# Patient Record
Sex: FEMALE | Race: WHITE | NOT HISPANIC OR LATINO | Employment: OTHER | URBAN - METROPOLITAN AREA
[De-identification: names, ages, dates, MRNs, and addresses within clinical notes are randomized per-mention and may not be internally consistent; named-entity substitution may affect disease eponyms.]

---

## 2019-09-09 ENCOUNTER — HOSPITAL ENCOUNTER (OUTPATIENT)
Dept: FAMILY MEDICINE CLINIC | Facility: CLINIC | Age: 74
Discharge: HOME | End: 2019-09-09
Attending: NURSE PRACTITIONER

## 2019-09-09 ENCOUNTER — OFFICE VISIT CONVERTED (OUTPATIENT)
Dept: FAMILY MEDICINE CLINIC | Facility: CLINIC | Age: 74
End: 2019-09-09
Attending: NURSE PRACTITIONER

## 2019-09-09 LAB
ALBUMIN SERPL-MCNC: 4.3 G/DL (ref 3.5–5)
ALBUMIN/GLOB SERPL: 1.8 {RATIO} (ref 1.4–2.6)
ALP SERPL-CCNC: 69 U/L (ref 43–160)
ALT SERPL-CCNC: 26 U/L (ref 10–40)
ANION GAP SERPL CALC-SCNC: 16 MMOL/L (ref 8–19)
AST SERPL-CCNC: 29 U/L (ref 15–50)
BASOPHILS # BLD AUTO: 0.03 10*3/UL (ref 0–0.2)
BASOPHILS NFR BLD AUTO: 0.6 % (ref 0–3)
BILIRUB SERPL-MCNC: 0.51 MG/DL (ref 0.2–1.3)
BUN SERPL-MCNC: 18 MG/DL (ref 5–25)
BUN/CREAT SERPL: 25 {RATIO} (ref 6–20)
CALCIUM SERPL-MCNC: 9.3 MG/DL (ref 8.7–10.4)
CHLORIDE SERPL-SCNC: 101 MMOL/L (ref 99–111)
CHOLEST SERPL-MCNC: 218 MG/DL (ref 107–200)
CHOLEST/HDLC SERPL: 3.3 {RATIO} (ref 3–6)
CONV ABS IMM GRAN: 0.01 10*3/UL (ref 0–0.2)
CONV CO2: 26 MMOL/L (ref 22–32)
CONV IMMATURE GRAN: 0.2 % (ref 0–1.8)
CONV TOTAL PROTEIN: 6.7 G/DL (ref 6.3–8.2)
CREAT UR-MCNC: 0.72 MG/DL (ref 0.5–0.9)
DEPRECATED RDW RBC AUTO: 47.8 FL (ref 36.4–46.3)
EOSINOPHIL # BLD AUTO: 0.1 10*3/UL (ref 0–0.7)
EOSINOPHIL # BLD AUTO: 2.1 % (ref 0–7)
ERYTHROCYTE [DISTWIDTH] IN BLOOD BY AUTOMATED COUNT: 13.9 % (ref 11.7–14.4)
EST. AVERAGE GLUCOSE BLD GHB EST-MCNC: 157 MG/DL
GFR SERPLBLD BASED ON 1.73 SQ M-ARVRAT: >60 ML/MIN/{1.73_M2}
GLOBULIN UR ELPH-MCNC: 2.4 G/DL (ref 2–3.5)
GLUCOSE SERPL-MCNC: 132 MG/DL (ref 65–99)
HBA1C MFR BLD: 7.1 % (ref 3.5–5.7)
HCT VFR BLD AUTO: 46.3 % (ref 37–47)
HDLC SERPL-MCNC: 66 MG/DL (ref 40–60)
HGB BLD-MCNC: 14.6 G/DL (ref 12–16)
LDLC SERPL CALC-MCNC: 130 MG/DL (ref 70–100)
LYMPHOCYTES # BLD AUTO: 1.76 10*3/UL (ref 1–5)
LYMPHOCYTES NFR BLD AUTO: 37.2 % (ref 20–45)
MCH RBC QN AUTO: 29.9 PG (ref 27–31)
MCHC RBC AUTO-ENTMCNC: 31.5 G/DL (ref 33–37)
MCV RBC AUTO: 94.7 FL (ref 81–99)
MONOCYTES # BLD AUTO: 0.45 10*3/UL (ref 0.2–1.2)
MONOCYTES NFR BLD AUTO: 9.5 % (ref 3–10)
NEUTROPHILS # BLD AUTO: 2.38 10*3/UL (ref 2–8)
NEUTROPHILS NFR BLD AUTO: 50.4 % (ref 30–85)
NRBC CBCN: 0 % (ref 0–0.7)
OSMOLALITY SERPL CALC.SUM OF ELEC: 292 MOSM/KG (ref 273–304)
PLATELET # BLD AUTO: 225 10*3/UL (ref 130–400)
PMV BLD AUTO: 11.8 FL (ref 9.4–12.3)
POTASSIUM SERPL-SCNC: 4.1 MMOL/L (ref 3.5–5.3)
RBC # BLD AUTO: 4.89 10*6/UL (ref 4.2–5.4)
SODIUM SERPL-SCNC: 139 MMOL/L (ref 135–147)
T4 FREE SERPL-MCNC: 1 NG/DL (ref 0.9–1.8)
TRIGL SERPL-MCNC: 110 MG/DL (ref 40–150)
TSH SERPL-ACNC: 1.31 M[IU]/L (ref 0.27–4.2)
VLDLC SERPL-MCNC: 22 MG/DL (ref 5–37)
WBC # BLD AUTO: 4.73 10*3/UL (ref 4.8–10.8)

## 2019-10-10 ENCOUNTER — HOSPITAL ENCOUNTER (OUTPATIENT)
Dept: GASTROENTEROLOGY | Facility: HOSPITAL | Age: 74
Setting detail: HOSPITAL OUTPATIENT SURGERY
Discharge: HOME | End: 2019-10-10
Attending: INTERNAL MEDICINE

## 2019-10-10 LAB — GLUCOSE BLD-MCNC: 131 MG/DL (ref 65–99)

## 2019-12-19 ENCOUNTER — HOSPITAL ENCOUNTER (OUTPATIENT)
Dept: MAMMOGRAPHY | Facility: HOSPITAL | Age: 74
Discharge: HOME | End: 2019-12-19
Attending: NURSE PRACTITIONER

## 2020-01-27 ENCOUNTER — HOSPITAL ENCOUNTER (OUTPATIENT)
Dept: MAMMOGRAPHY | Facility: HOSPITAL | Age: 75
Discharge: HOME | End: 2020-01-27
Attending: NURSE PRACTITIONER

## 2020-01-30 ENCOUNTER — OFFICE VISIT CONVERTED (OUTPATIENT)
Dept: FAMILY MEDICINE CLINIC | Facility: CLINIC | Age: 75
End: 2020-01-30
Attending: NURSE PRACTITIONER

## 2020-02-04 ENCOUNTER — HOSPITAL ENCOUNTER (OUTPATIENT)
Dept: FAMILY MEDICINE CLINIC | Facility: CLINIC | Age: 75
Discharge: HOME | End: 2020-02-04
Attending: NURSE PRACTITIONER

## 2020-02-04 LAB
ALBUMIN SERPL-MCNC: 3.8 G/DL (ref 3.5–5)
ALBUMIN/GLOB SERPL: 1.6 {RATIO} (ref 1.4–2.6)
ALP SERPL-CCNC: 59 U/L (ref 43–160)
ALT SERPL-CCNC: 20 U/L (ref 10–40)
ANION GAP SERPL CALC-SCNC: 18 MMOL/L (ref 8–19)
AST SERPL-CCNC: 23 U/L (ref 15–50)
BASOPHILS # BLD AUTO: 0.04 10*3/UL (ref 0–0.2)
BASOPHILS NFR BLD AUTO: 0.8 % (ref 0–3)
BILIRUB SERPL-MCNC: 0.43 MG/DL (ref 0.2–1.3)
BUN SERPL-MCNC: 12 MG/DL (ref 5–25)
BUN/CREAT SERPL: 16 {RATIO} (ref 6–20)
CALCIUM SERPL-MCNC: 9.6 MG/DL (ref 8.7–10.4)
CHLORIDE SERPL-SCNC: 102 MMOL/L (ref 99–111)
CHOLEST SERPL-MCNC: 212 MG/DL (ref 107–200)
CHOLEST/HDLC SERPL: 4 {RATIO} (ref 3–6)
CONV ABS IMM GRAN: 0.02 10*3/UL (ref 0–0.2)
CONV CO2: 25 MMOL/L (ref 22–32)
CONV IMMATURE GRAN: 0.4 % (ref 0–1.8)
CONV TOTAL PROTEIN: 6.2 G/DL (ref 6.3–8.2)
CREAT UR-MCNC: 0.74 MG/DL (ref 0.5–0.9)
DEPRECATED RDW RBC AUTO: 46 FL (ref 36.4–46.3)
EOSINOPHIL # BLD AUTO: 0.07 10*3/UL (ref 0–0.7)
EOSINOPHIL # BLD AUTO: 1.4 % (ref 0–7)
ERYTHROCYTE [DISTWIDTH] IN BLOOD BY AUTOMATED COUNT: 13.3 % (ref 11.7–14.4)
EST. AVERAGE GLUCOSE BLD GHB EST-MCNC: 166 MG/DL
GFR SERPLBLD BASED ON 1.73 SQ M-ARVRAT: >60 ML/MIN/{1.73_M2}
GLOBULIN UR ELPH-MCNC: 2.4 G/DL (ref 2–3.5)
GLUCOSE SERPL-MCNC: 143 MG/DL (ref 65–99)
HBA1C MFR BLD: 7.4 % (ref 3.5–5.7)
HCT VFR BLD AUTO: 46.5 % (ref 37–47)
HDLC SERPL-MCNC: 53 MG/DL (ref 40–60)
HGB BLD-MCNC: 14.6 G/DL (ref 12–16)
LDLC SERPL CALC-MCNC: 133 MG/DL (ref 70–100)
LYMPHOCYTES # BLD AUTO: 1.74 10*3/UL (ref 1–5)
LYMPHOCYTES NFR BLD AUTO: 35.2 % (ref 20–45)
MCH RBC QN AUTO: 29.5 PG (ref 27–31)
MCHC RBC AUTO-ENTMCNC: 31.4 G/DL (ref 33–37)
MCV RBC AUTO: 93.9 FL (ref 81–99)
MONOCYTES # BLD AUTO: 0.38 10*3/UL (ref 0.2–1.2)
MONOCYTES NFR BLD AUTO: 7.7 % (ref 3–10)
NEUTROPHILS # BLD AUTO: 2.69 10*3/UL (ref 2–8)
NEUTROPHILS NFR BLD AUTO: 54.5 % (ref 30–85)
NRBC CBCN: 0 % (ref 0–0.7)
OSMOLALITY SERPL CALC.SUM OF ELEC: 292 MOSM/KG (ref 273–304)
PLATELET # BLD AUTO: 253 10*3/UL (ref 130–400)
PMV BLD AUTO: 11.3 FL (ref 9.4–12.3)
POTASSIUM SERPL-SCNC: 4.6 MMOL/L (ref 3.5–5.3)
RBC # BLD AUTO: 4.95 10*6/UL (ref 4.2–5.4)
SODIUM SERPL-SCNC: 140 MMOL/L (ref 135–147)
TRIGL SERPL-MCNC: 131 MG/DL (ref 40–150)
VLDLC SERPL-MCNC: 26 MG/DL (ref 5–37)
WBC # BLD AUTO: 4.94 10*3/UL (ref 4.8–10.8)

## 2020-04-29 ENCOUNTER — TELEMEDICINE CONVERTED (OUTPATIENT)
Dept: FAMILY MEDICINE CLINIC | Facility: CLINIC | Age: 75
End: 2020-04-29
Attending: NURSE PRACTITIONER

## 2020-07-31 ENCOUNTER — HOSPITAL ENCOUNTER (OUTPATIENT)
Dept: MAMMOGRAPHY | Facility: HOSPITAL | Age: 75
Discharge: HOME | End: 2020-07-31
Attending: NURSE PRACTITIONER

## 2020-08-05 ENCOUNTER — HOSPITAL ENCOUNTER (OUTPATIENT)
Dept: FAMILY MEDICINE CLINIC | Facility: CLINIC | Age: 75
Discharge: HOME | End: 2020-08-05
Attending: NURSE PRACTITIONER

## 2020-08-05 ENCOUNTER — OFFICE VISIT CONVERTED (OUTPATIENT)
Dept: FAMILY MEDICINE CLINIC | Facility: CLINIC | Age: 75
End: 2020-08-05
Attending: NURSE PRACTITIONER

## 2020-08-05 LAB
BASOPHILS # BLD AUTO: 0.03 10*3/UL (ref 0–0.2)
BASOPHILS NFR BLD AUTO: 0.7 % (ref 0–3)
CONV ABS IMM GRAN: 0.01 10*3/UL (ref 0–0.2)
CONV IMMATURE GRAN: 0.2 % (ref 0–1.8)
DEPRECATED RDW RBC AUTO: 46.8 FL (ref 36.4–46.3)
EOSINOPHIL # BLD AUTO: 0.06 10*3/UL (ref 0–0.7)
EOSINOPHIL # BLD AUTO: 1.3 % (ref 0–7)
ERYTHROCYTE [DISTWIDTH] IN BLOOD BY AUTOMATED COUNT: 13.5 % (ref 11.7–14.4)
HCT VFR BLD AUTO: 47.7 % (ref 37–47)
HGB BLD-MCNC: 14.9 G/DL (ref 12–16)
LYMPHOCYTES # BLD AUTO: 1.7 10*3/UL (ref 1–5)
LYMPHOCYTES NFR BLD AUTO: 37.8 % (ref 20–45)
MCH RBC QN AUTO: 29.6 PG (ref 27–31)
MCHC RBC AUTO-ENTMCNC: 31.2 G/DL (ref 33–37)
MCV RBC AUTO: 94.6 FL (ref 81–99)
MONOCYTES # BLD AUTO: 0.33 10*3/UL (ref 0.2–1.2)
MONOCYTES NFR BLD AUTO: 7.3 % (ref 3–10)
NEUTROPHILS # BLD AUTO: 2.37 10*3/UL (ref 2–8)
NEUTROPHILS NFR BLD AUTO: 52.7 % (ref 30–85)
NRBC CBCN: 0 % (ref 0–0.7)
PLATELET # BLD AUTO: 222 10*3/UL (ref 130–400)
PMV BLD AUTO: 11.9 FL (ref 9.4–12.3)
RBC # BLD AUTO: 5.04 10*6/UL (ref 4.2–5.4)
WBC # BLD AUTO: 4.5 10*3/UL (ref 4.8–10.8)

## 2020-08-06 LAB
25(OH)D3 SERPL-MCNC: 74 NG/ML (ref 30–100)
ALBUMIN SERPL-MCNC: 4.1 G/DL (ref 3.5–5)
ALBUMIN/GLOB SERPL: 1.4 {RATIO} (ref 1.4–2.6)
ALP SERPL-CCNC: 70 U/L (ref 43–160)
ALT SERPL-CCNC: 21 U/L (ref 10–40)
ANION GAP SERPL CALC-SCNC: 24 MMOL/L (ref 8–19)
AST SERPL-CCNC: 24 U/L (ref 15–50)
BILIRUB SERPL-MCNC: 0.4 MG/DL (ref 0.2–1.3)
BUN SERPL-MCNC: 9 MG/DL (ref 5–25)
BUN/CREAT SERPL: 11 {RATIO} (ref 6–20)
CALCIUM SERPL-MCNC: 10 MG/DL (ref 8.7–10.4)
CHLORIDE SERPL-SCNC: 104 MMOL/L (ref 99–111)
CHOLEST SERPL-MCNC: 231 MG/DL (ref 107–200)
CHOLEST/HDLC SERPL: 3.9 {RATIO} (ref 3–6)
CONV CO2: 22 MMOL/L (ref 22–32)
CONV CREATININE URINE, RANDOM: 129.3 MG/DL (ref 10–300)
CONV MICROALBUM.,U,RANDOM: 17.7 MG/L (ref 0–20)
CONV TOTAL PROTEIN: 7 G/DL (ref 6.3–8.2)
CREAT UR-MCNC: 0.84 MG/DL (ref 0.5–0.9)
EST. AVERAGE GLUCOSE BLD GHB EST-MCNC: 157 MG/DL
GFR SERPLBLD BASED ON 1.73 SQ M-ARVRAT: >60 ML/MIN/{1.73_M2}
GLOBULIN UR ELPH-MCNC: 2.9 G/DL (ref 2–3.5)
GLUCOSE SERPL-MCNC: 113 MG/DL (ref 65–99)
HBA1C MFR BLD: 7.1 % (ref 3.5–5.7)
HDLC SERPL-MCNC: 59 MG/DL (ref 40–60)
LDLC SERPL CALC-MCNC: 130 MG/DL (ref 70–100)
MICROALBUMIN/CREAT UR: 13.7 MG/G{CRE} (ref 0–35)
OSMOLALITY SERPL CALC.SUM OF ELEC: 299 MOSM/KG (ref 273–304)
POTASSIUM SERPL-SCNC: 4.5 MMOL/L (ref 3.5–5.3)
SODIUM SERPL-SCNC: 145 MMOL/L (ref 135–147)
T4 FREE SERPL-MCNC: 1 NG/DL (ref 0.9–1.8)
TRIGL SERPL-MCNC: 212 MG/DL (ref 40–150)
TSH SERPL-ACNC: 2.39 M[IU]/L (ref 0.27–4.2)
VLDLC SERPL-MCNC: 42 MG/DL (ref 5–37)

## 2020-08-07 LAB — HCV AB SER DONR QL: <0.1 S/CO RATIO (ref 0–0.9)

## 2021-02-05 ENCOUNTER — HOSPITAL ENCOUNTER (OUTPATIENT)
Dept: FAMILY MEDICINE CLINIC | Facility: CLINIC | Age: 76
Discharge: HOME | End: 2021-02-05
Attending: NURSE PRACTITIONER

## 2021-02-05 ENCOUNTER — OFFICE VISIT CONVERTED (OUTPATIENT)
Dept: FAMILY MEDICINE CLINIC | Facility: CLINIC | Age: 76
End: 2021-02-05
Attending: NURSE PRACTITIONER

## 2021-02-05 LAB
BASOPHILS # BLD AUTO: 0.04 10*3/UL (ref 0–0.2)
BASOPHILS NFR BLD AUTO: 0.7 % (ref 0–3)
CONV ABS IMM GRAN: 0.01 10*3/UL (ref 0–0.2)
CONV IMMATURE GRAN: 0.2 % (ref 0–1.8)
DEPRECATED RDW RBC AUTO: 43.1 FL (ref 36.4–46.3)
EOSINOPHIL # BLD AUTO: 0.08 10*3/UL (ref 0–0.7)
EOSINOPHIL # BLD AUTO: 1.5 % (ref 0–7)
ERYTHROCYTE [DISTWIDTH] IN BLOOD BY AUTOMATED COUNT: 13 % (ref 11.7–14.4)
HCT VFR BLD AUTO: 47 % (ref 37–47)
HGB BLD-MCNC: 15.2 G/DL (ref 12–16)
LYMPHOCYTES # BLD AUTO: 2.13 10*3/UL (ref 1–5)
LYMPHOCYTES NFR BLD AUTO: 39.4 % (ref 20–45)
MCH RBC QN AUTO: 29.4 PG (ref 27–31)
MCHC RBC AUTO-ENTMCNC: 32.3 G/DL (ref 33–37)
MCV RBC AUTO: 90.9 FL (ref 81–99)
MONOCYTES # BLD AUTO: 0.44 10*3/UL (ref 0.2–1.2)
MONOCYTES NFR BLD AUTO: 8.1 % (ref 3–10)
NEUTROPHILS # BLD AUTO: 2.7 10*3/UL (ref 2–8)
NEUTROPHILS NFR BLD AUTO: 50.1 % (ref 30–85)
NRBC CBCN: 0 % (ref 0–0.7)
PLATELET # BLD AUTO: 231 10*3/UL (ref 130–400)
PMV BLD AUTO: 11.1 FL (ref 9.4–12.3)
RBC # BLD AUTO: 5.17 10*6/UL (ref 4.2–5.4)
T4 FREE SERPL-MCNC: 1.2 NG/DL (ref 0.9–1.8)
TSH SERPL-ACNC: 1.44 M[IU]/L (ref 0.27–4.2)
WBC # BLD AUTO: 5.4 10*3/UL (ref 4.8–10.8)

## 2021-02-06 LAB
ALBUMIN SERPL-MCNC: 4.3 G/DL (ref 3.5–5)
ALBUMIN/GLOB SERPL: 1.5 {RATIO} (ref 1.4–2.6)
ALP SERPL-CCNC: 75 U/L (ref 43–160)
ALT SERPL-CCNC: 22 U/L (ref 10–40)
ANION GAP SERPL CALC-SCNC: 14 MMOL/L (ref 8–19)
AST SERPL-CCNC: 22 U/L (ref 15–50)
BILIRUB SERPL-MCNC: 0.52 MG/DL (ref 0.2–1.3)
BUN SERPL-MCNC: 11 MG/DL (ref 5–25)
BUN/CREAT SERPL: 14 {RATIO} (ref 6–20)
CALCIUM SERPL-MCNC: 9.8 MG/DL (ref 8.7–10.4)
CHLORIDE SERPL-SCNC: 102 MMOL/L (ref 99–111)
CHOLEST SERPL-MCNC: 252 MG/DL (ref 107–200)
CHOLEST/HDLC SERPL: 3.8 {RATIO} (ref 3–6)
CONV CO2: 29 MMOL/L (ref 22–32)
CONV TOTAL PROTEIN: 7.1 G/DL (ref 6.3–8.2)
CREAT UR-MCNC: 0.79 MG/DL (ref 0.5–0.9)
EST. AVERAGE GLUCOSE BLD GHB EST-MCNC: 180 MG/DL
GFR SERPLBLD BASED ON 1.73 SQ M-ARVRAT: >60 ML/MIN/{1.73_M2}
GLOBULIN UR ELPH-MCNC: 2.8 G/DL (ref 2–3.5)
GLUCOSE SERPL-MCNC: 138 MG/DL (ref 65–99)
HBA1C MFR BLD: 7.9 % (ref 3.5–5.7)
HDLC SERPL-MCNC: 67 MG/DL (ref 40–60)
LDLC SERPL CALC-MCNC: 149 MG/DL (ref 70–100)
OSMOLALITY SERPL CALC.SUM OF ELEC: 294 MOSM/KG (ref 273–304)
POTASSIUM SERPL-SCNC: 4.1 MMOL/L (ref 3.5–5.3)
SODIUM SERPL-SCNC: 141 MMOL/L (ref 135–147)
TRIGL SERPL-MCNC: 178 MG/DL (ref 40–150)
VLDLC SERPL-MCNC: 36 MG/DL (ref 5–37)

## 2021-05-12 NOTE — PROGRESS NOTES
Quick Note      Patient Name: Edna Alejandra   Patient ID: [id number]   Sex: Female   YOB: 1945    Primary Care Provider: Katherine WHEELER   Referring Provider: Katherine WHEELER    Visit Date: April 29, 2020    Provider: LIAM Justin   Location: [facility]   Location Address: [address] [place] [address], [address]  [id number]   Location Phone: [phone]          History Of Present Illness  TELEHEALTH TELEPHONE VISIT  Chief Complaint: 3-month follow-up   Edna Alejandra is a 74 year old /White female who is presenting for evaluation via telehealth telephone visit. Verbal consent obtained before beginning visit.   Provider spent 13 minutes with the patient during telehealth visit.   The following staff were present during this visit: Devora Guillaume/Afia Herron   Past Medical History/Overview of Patient Symptoms     Patient is a 74-year-old female spoke with over the telephone.  3-month follow-up.  She was complaining of arthritis but will not take NSAIDs due to side effects after reading them on the Internet.  She states that she is taking her blood sugar and is averaging between 120 and 130.  She states she feels well.  She is due for a repeat mammogram in 3 months due to an abnormal mammogram done in January.           Assessment  · Diabetes mellitus, type 2     250.00/E11.9  We will continue to monitor blood sugars we will follow-up in 3 months in office.  · Osteoarthritis     715.90/M19.90  Discussed with patient to try Tylenol arthritis. Patient verbalized understanding.      Plan  · Orders  o Physician Telephone Evaluation, 21-30 minutes (59688) - - 04/29/2020  · Instructions  o Continue blood sugar monitoring daily and record. Bring your log to office visits. Call the office for readings below 70 and above 250 or any complications.  o Daily foot care. Avoid walking barefoot. Annual Dilated Eye Exam.  o Discussed with patient blood pressure monitoring, hemoglobin  A1C levels need to be below 7.0, and LDL (Lipid) goals below 70.  o Plan Of Care:   o Take all medications as prescribed/directed.  o Call the office with any concerns or questions.  · Disposition  o Call or Return if symptoms worsen or persist.  o follow up as needed  o call the office with any questions or concerns  o Follow-up in 3 months            Electronically Signed by: Afia Herron APRN -Author on April 29, 2020 04:25:46 PM

## 2021-05-13 NOTE — PROGRESS NOTES
Progress Note      Patient Name: Edna Alejandra   Patient ID: [id number]   Sex: Female   YOB: 1945    Primary Care Provider: Afia WHEELER   Referring Provider: Afia WHEELER    Visit Date: August 5, 2020    Provider: LIAM Justin   Location: [facility]   Location Address: [address] [place] [address], [address] [place] [address]  [id number]   Location Phone: [phone]          Chief Complaint  · follow-up      History Of Present Illness  Edna Alejandra is a 75 year old /White female who presents for evaluation and treatment of:      She was 75-year-old female who comes in for a follow-up.  Patient's last labs were February 2020 hemoglobin A1c at that time was 7.4.  Last DEXA scan was 5 years ago when patient left in [place].  Last eye exam was 2 to 3 years ago last foot exam was about a year and a half ago when she lived in [place].  She states her blood sugar this morning was 119.  She is try to watch her diet, and try to lose weight her goal was to come off her diabetic medications.  She otherwise looks very well and is doing very well.       Past Medical History  Disease Name Date Onset Notes   Allergies --  --    Anemia 1982 --    Arthritis --  --    Cataracts, bilateral --  --    Diabetes --  --    Forgetfulness --  --    Gall Stones 01/1985 --    Head injury 1964 --    Hemorrhoids 1967 --    Hyperlipidemia --  --    Reflux Disease --  --    Screening for breast cancer 12/19/19 WNL, REPEAT 1 YR   Seizures --  As a child   Shortness of Breath --  --    Sinus trouble --  --          Past Surgical History  Procedure Name Date Notes   Appendectomy 04/1966 --    Breast biopsy, right breast 08/2013 --    Cataract surgery 05/2018 --    Colonoscopy --  10 years ago   EGD 2019 Done in [place]   Excision of ingrown toenail of left foot 06/1965; 2008; 2016 Left big toe   Gallbladder 01/1985 --    Hysterectomy 06/1984 --    Tonsillectomy 05/1965 --          Medication List  Name  Date Started Instructions   B-complex with vitamin C oral tablet  take 1 tablet by oral route daily   calcium-magnesium-zinc oral tablet  --    Cinnamon 500 mg oral capsule  take 2 capsules by oral route daily   Co Q-10 100 mg oral capsule  take 1 capsule by oral route daily   glimepiride 1 mg oral tablet 07/20/2020 TAKE 1 TABLET BY MOUTH EVERY DAY   NuLYTELY with Flavor Packs 420 gram oral recon soln 09/23/2019 Take as directed by your providers instructions   Omega-3 350 mg-235 mg- 90 mg-597 mg oral capsule,delayed release(DR/EC)  take 1 capsule by oral route once   Yasmeen-C oral crystals  take as directed   Yasmeen-Melodie Multi vitamin oral  1 tablet once daily   Vitamin D3 1,000 unit oral capsule  take 1 capsule by oral route daily   vitamin E 400 unit oral capsule  take 1 capsule by oral route daily         Allergy List  Allergen Name Date Reaction Notes   Codiene --  --  --          Family Medical History  Disease Name Relative/Age Notes   Heart Disease  sibling   Diabetes  sibling   No family history of colorectal cancer  --          Social History  Finding Status Start/Stop Quantity Notes   Retired --  --/-- --  --    Tobacco Never --/-- --  --          Review of Systems  · Constitutional  o Denies  o : fever, fatigue, weight loss, weight gain  · Eyes  o Denies  o : double vision, impaired vision, blurred vision  · HENT  o Denies  o : headaches, vertigo, lightheadedness  · Cardiovascular  o Denies  o : lower extremity edema, claudication, chest pressure, palpitations  · Respiratory  o Denies  o : shortness of breath, wheezing, cough, hemoptysis, dyspnea on exertion  · Gastrointestinal  o Denies  o : nausea, vomiting, diarrhea, constipation, abdominal pain  · Integument  o Denies  o : rash, itching, pigmentation changes  · Musculoskeletal  o Denies  o : joint pain, joint swelling, muscle pain  · Psychiatric  o Denies  o : anxiety, depression, suicidal ideation, homicidal ideation      Vitals  Date Time BP Position  "Site L\R Cuff Size HR RR TEMP (F) WT  HT  BMI kg/m2 BSA m2 O2 Sat        08/05/2020 08:09 /80 Sitting    61 - R  97.3 208lbs 2oz 5'  3\" 36.87 2.05 100 %          Physical Examination  · Constitutional  o Appearance  o : well-nourished, well developed, in no acute distress  · Eyes  o Conjunctivae  o : conjunctivae normal, no exudates present  o Sclerae  o : sclerae white  o Pupils and Irises  o : pupils equal and round, and reactive to light and accomodation bilaterally  o Eyelids/Ocular Adnexae  o : extra ocular movements intact  · Respiratory  o Respiratory Effort  o : breathing unlabored, no accessory muscle use  o Inspection of Chest  o : normal appearance, no retractions  o Auscultation of Lungs  o : normal breath sounds bilaterally  · Cardiovascular  o Heart  o :   § Auscultation of Heart  § : regular rate and rhythm, no murmurs, gallops or rubs  o Peripheral Vascular System  o :   § Extremities  § : no edema  · Neurologic  o Mental Status Examination  o :   § Orientation  § : alert and oriented x3  § Speech/Language  § : normal speech pattern  o Gait and Station  o : normal gait, able to stand without difficulty  · Psychiatric  o Judgment and Insight  o : judgment and insight intact, judgement for everyday activities and social situations within normal limits, insight intact  o Thought Processes  o : rate of thoughts normal, thought content logical  o Mood and Affect  o : mood normal, affect appropriate              Assessment  · Need for hepatitis C screening test     V73.89/Z11.59  · Diabetes mellitus, type 2     250.00/E11.9  · Hyperlipidemia     272.4/E78.5  · Polyarthralgia     719.49/M25.50  · Vitamin D deficiency     268.9/E55.9  · Follow up     V67.9/Z09  6-month follow-up, she is doing well without complaint. Not needing medication refills at this time. We will continue to monitor patient's care and check labs.    Problems Reconciled  Plan  · Orders  o Hepatitis C antibody MEDICARE screening " [facility] ([id number], ) - V73.89/Z11.59 - 08/05/2020  o Diabetes 2 Panel (Urine Microalbumin, CMP, Lipid, A1c, ) [facility] (18934, 81988, 28608, 38187) - 250.00/E11.9 - 08/05/2020  o CBC with Auto Diff [facility] (39843) - 250.00/E11.9 - 08/05/2020  o Thyroid Profile (65103, 53165, THYII) - 250.00/E11.9 - 08/05/2020  o Vitamin D Level (89919) - 268.9/E55.9 - 08/05/2020  o ACO - Pt declines to or was not able to provide an Advance Care Plan or name a Surrogate Decision Maker (1124F) - - 08/05/2020  o ACO-39: Current medications updated and reviewed () - - 08/05/2020  · Medications  o Medications have been Reconciled  o Transition of Care or Provider Policy  · Instructions  o Medicare suggests a once in a lifetime screening for Hepatitis C for all Medicare beneficiaries born between 3674-7678.  o Continue blood sugar monitoring daily and record. Bring your log to office visits. Call the office for readings below 70 and above 250 or any complications.  o Daily foot care. Avoid walking barefoot. Annual Dilated Eye Exam.  o Discussed with patient blood pressure monitoring, hemoglobin A1C levels need to be below 7.0, and LDL (Lipid) goals below 70.  o Recommended exercise program to assist with cholesterol, weight loss and overall health improvement.  o Advised that cheeses and other sources of dairy fats, animal fats, fast food, and the extras (candy, pastries, pies, doughnuts and cookies) all contain LDL raising nutrients. Advised to increase fruits, vegetables, whole grains, and to monitor portion sizes.   o Patient was educated/instructed on their diagnosis, treatment and medications prior to discharge from the clinic today.  · Disposition  o Call or Return if symptoms worsen or persist.  o follow up in 6 months  o follow up as needed  o call the office with any questions or concerns            Electronically Signed by: LIAM Justin -Author on August 5, 2020 08:45:25 AM

## 2021-05-14 VITALS
OXYGEN SATURATION: 99 % | WEIGHT: 217.5 LBS | HEIGHT: 63 IN | BODY MASS INDEX: 38.54 KG/M2 | HEART RATE: 62 BPM | TEMPERATURE: 96.6 F | SYSTOLIC BLOOD PRESSURE: 146 MMHG | DIASTOLIC BLOOD PRESSURE: 96 MMHG

## 2021-05-14 NOTE — PROGRESS NOTES
Progress Note      Patient Name: Edna Alejandra   Patient ID: [id number]   Sex: Female   YOB: 1945    Primary Care Provider: Afia WHEELER   Referring Provider: Afia WHEELER    Visit Date: February 5, 2021    Provider: LIAM Justin   Location: [facility]   Location Address: [address] [place] [address], [address] [place] [address]  [id number]   Location Phone: [phone]          Chief Complaint  · 6 month follow-up      History Of Present Illness  Edna Alejandra is a 75 year old /White female who presents for evaluation and treatment of:      Patient is a 75-year-old female who comes in for a wellness exam/6-month follow-up.  She declines a flu shot, she is never smoked.  Depression screening was negative with one-point.  Last DEXA scan was 7 years ago.  Last mammogram which July 2020.  Last hemoglobin A1c in August was 7.1.  Patient is currently on glipimide 1 mg, she stopped the Zetia.  She admits she has had a poor diet over the holidays.  She states her blood sugars were running a little high but she is got back on track and this morning it was 130.  Blood pressure was elevated today at 146/96.  She states she does have a blood pressure cuff at home, but has not checked it in a while.  She denies any headache, chest pain, or change in vision.      Patient is complaining of some intermittent slight eye pain.  She had cataract surgery several years ago.  She states the pain started several months ago.  She states it is very mild comes and goes but she wanted to see ophthalmology to make sure there is no scar tissue occurring.  She denies any change in vision.  She denies any recent injury to the eye.       Past Medical History  Disease Name Date Onset Notes   Allergies --  --    Anemia 1982 --    Arthritis --  --    Cataracts, bilateral --  --    Diabetes --  --    Forgetfulness --  --    Gall Stones 01/1985 --    Head injury 1964 --    Hemorrhoids 1967 --     Hyperlipidemia --  --    Reflux Disease --  --    Screening for breast cancer 12/19/19 WNL, REPEAT 1 YR   Seizures --  As a child   Shortness of Breath --  --    Sinus trouble --  --          Past Surgical History  Procedure Name Date Notes   Appendectomy 04/1966 --    Breast biopsy, right breast 08/2013 --    Cataract surgery 05/2018 --    Colonoscopy --  10 years ago   EGD 2019 Done in [place]   Excision of ingrown toenail of left foot 06/1965; 2008; 2016 Left big toe   Gallbladder 01/1985 --    Hysterectomy 06/1984 --    Tonsillectomy 05/1965 --          Medication List  Name Date Started Instructions   B-complex with vitamin C oral tablet  take 1 tablet by oral route daily   calcium-magnesium-zinc oral tablet  --    Cinnamon 500 mg oral capsule  take 2 capsules by oral route daily   Co Q-10 100 mg oral capsule  take 1 capsule by oral route daily   glimepiride 1 mg oral tablet 10/28/2020 TAKE 1 TABLET BY MOUTH EVERY DAY for 90 days   Omega-3 350 mg-235 mg- 90 mg-597 mg oral capsule,delayed release(DR/EC)  take 1 capsule by oral route once   Yasmeen-C oral crystals  take as directed   Yasmeen-Melodie Multi vitamin oral  1 tablet once daily   Vitamin D3 1,000 unit oral capsule  take 1 capsule by oral route daily   vitamin E 400 unit oral capsule  take 1 capsule by oral route daily         Allergy List  Allergen Name Date Reaction Notes   Codiene --  --  --        Allergies Reconciled  Family Medical History  Disease Name Relative/Age Notes   Heart Disease  sibling   Diabetes  sibling   No family history of colorectal cancer  --          Social History  Finding Status Start/Stop Quantity Notes   Retired --  --/-- --  --    Tobacco Never --/-- --  --          Review of Systems  · Constitutional  o Denies  o : fever, fatigue, weight loss, weight gain  · Eyes  o Admits  o : eye pain  o Denies  o : discharge from eye, eye discomfort  · HENT  o Denies  o : headaches, vertigo, lightheadedness  · Cardiovascular  o Denies  o :  "lower extremity edema, claudication, chest pressure, palpitations  · Respiratory  o Denies  o : shortness of breath, wheezing, cough, hemoptysis, dyspnea on exertion  · Gastrointestinal  o Denies  o : nausea, vomiting, diarrhea, constipation, abdominal pain  · Integument  o Denies  o : rash, itching, pigmentation changes  · Musculoskeletal  o Denies  o : joint pain, joint swelling, muscle pain  · Psychiatric  o Denies  o : anxiety, depression, suicidal ideation, homicidal ideation      Vitals  Date Time BP Position Site L\R Cuff Size HR RR TEMP (F) WT  HT  BMI kg/m2 BSA m2 O2 Sat FR L/min FiO2        02/05/2021 03:37 /96 Sitting    62 - R  96.6 217lbs 8oz 5'  3\" 38.53 2.09 99 %            Physical Examination  · Constitutional  o Appearance  o : well-nourished, well developed, in no acute distress  · Eyes  o Conjunctivae  o : conjunctivae normal, no exudates present  o Sclerae  o : sclerae white  o Pupils and Irises  o : pupils equal and round, and reactive to light and accomodation bilaterally  o Eyelids/Ocular Adnexae  o : extra ocular movements intact  · Respiratory  o Respiratory Effort  o : breathing unlabored, no accessory muscle use  o Inspection of Chest  o : normal appearance, no retractions  o Auscultation of Lungs  o : normal breath sounds bilaterally  · Cardiovascular  o Heart  o :   § Auscultation of Heart  § : regular rate and rhythm, no murmurs, gallops or rubs  o Peripheral Vascular System  o :   § Extremities  § : no edema  · Neurologic  o Mental Status Examination  o :   § Orientation  § : alert and oriented x3  § Speech/Language  § : normal speech pattern  o Gait and Station  o : normal gait, able to stand without difficulty  · Psychiatric  o Judgement and Insight  o : judgment and insight intact, judgement for everyday activities and social situations within normal limits, insight intact  o Thought Processes  o : rate of thoughts normal, thought content logical  o Mood and Affect  o : " mood normal, affect appropriate              Assessment  · Screening for depression     V79.0/Z13.89  · Diabetes mellitus, type 2     250.00/E11.9  We will recheck labs discussed with patient if elevated I will give her 3 months to get it down below 7 naturally. I would do that before adjusting medication. Patient verbalized understanding.  · Hyperlipidemia     272.4/E78.5  · Obesity     278.00/E66.9  · Hx of cataract surgery     V45.61/Z98.49  Refer over to Dr. Lui for further evaluation.  · Bilateral eye complaint     379.90/H57.9  · Wellness examination     V70.0/Z00.00      Plan  · Orders  o ACO-18: Negative screen for clinical depression using a standardized tool () - V79.0/Z13.89 - 02/05/2021   1  o Diabetes 1 Panel (CMP, Lipid, A1c) [facility] (01140, 00254, 21578) - 250.00/E11.9 - 02/05/2021  o CBC with Auto Diff [facility] (48109) - 250.00/E11.9 - 02/05/2021  o Thyroid Profile (55179, 61820, THYII) - 250.00/E11.9 - 02/05/2021  o OPHTHALMOLOGY CONSULTATION (OPHTH) - 250.00/E11.9 - 02/05/2021   Dr. Lui  o ACO-14: Influenza immunization was not administered for reasons documented [facility] () - - 02/05/2021   pt declines  o ACO-20: Screening Mammography documented and reviewed [facility] () - - 02/05/2021 07/20  o ACO-13: Fall Risk Screening with no falls in past year or only one fall without injury in the past year (1101F) - - 02/05/2021  o ACO-39: Current medications updated and reviewed (1159F, ) - - 02/05/2021  · Medications  o glimepiride 1 mg oral tablet   SIG: TAKE 1 TABLET BY MOUTH EVERY DAY for 90 days   DISP: (90) Tablet with 1 refills  Adjusted on 02/05/2021     o Medications have been Reconciled  o Transition of Care or Provider Policy  · Instructions  o Depression Screen completed and scanned into the EMR under the designated folder within the patient's documents.  o Today's PHQ-9 result is _1_  o Continue blood sugar monitoring daily and record. Bring your log to office visits. Call the office  for readings below 70 and above 250 or any complications.  o Daily foot care. Avoid walking barefoot. Annual Dilated Eye Exam.  o Discussed with patient blood pressure monitoring, hemoglobin A1C levels need to be below 7.0, and LDL (Lipid) goals below 70.  o Advised that cheeses and other sources of dairy fats, animal fats, fast food, and the extras (candy, pastries, pies, doughnuts and cookies) all contain LDL raising nutrients. Advised to increase fruits, vegetables, whole grains, and to monitor portion sizes.   o Take all medications as prescribed/directed.  o Patient was educated/instructed on their diagnosis, treatment and medications prior to discharge from the clinic today.  o Flu vaccine declined.  · Disposition  o Call or Return if symptoms worsen or persist.  o follow up in 6 months  o follow up as needed  o call the office with any questions or concerns            Electronically Signed by: Afia Herron APRN -Author on February 5, 2021 04:31:54 PM

## 2021-05-15 VITALS
OXYGEN SATURATION: 97 % | DIASTOLIC BLOOD PRESSURE: 88 MMHG | BODY MASS INDEX: 24.72 KG/M2 | HEART RATE: 66 BPM | SYSTOLIC BLOOD PRESSURE: 134 MMHG | TEMPERATURE: 98.3 F | WEIGHT: 139.5 LBS | HEIGHT: 63 IN

## 2021-05-15 VITALS
TEMPERATURE: 97.3 F | DIASTOLIC BLOOD PRESSURE: 80 MMHG | HEIGHT: 63 IN | WEIGHT: 208.12 LBS | OXYGEN SATURATION: 100 % | SYSTOLIC BLOOD PRESSURE: 126 MMHG | HEART RATE: 61 BPM | BODY MASS INDEX: 36.88 KG/M2

## 2021-05-15 VITALS
OXYGEN SATURATION: 96 % | DIASTOLIC BLOOD PRESSURE: 72 MMHG | HEIGHT: 63 IN | BODY MASS INDEX: 38.67 KG/M2 | TEMPERATURE: 97.9 F | SYSTOLIC BLOOD PRESSURE: 132 MMHG | WEIGHT: 218.25 LBS | HEART RATE: 69 BPM

## 2021-08-06 ENCOUNTER — OFFICE VISIT (OUTPATIENT)
Dept: FAMILY MEDICINE CLINIC | Facility: CLINIC | Age: 76
End: 2021-08-06

## 2021-08-06 VITALS
TEMPERATURE: 98.5 F | SYSTOLIC BLOOD PRESSURE: 136 MMHG | DIASTOLIC BLOOD PRESSURE: 82 MMHG | HEART RATE: 81 BPM | BODY MASS INDEX: 38.27 KG/M2 | WEIGHT: 216 LBS | HEIGHT: 63 IN | OXYGEN SATURATION: 95 %

## 2021-08-06 DIAGNOSIS — M25.50 POLYARTHRALGIA: ICD-10-CM

## 2021-08-06 DIAGNOSIS — Z12.31 ENCOUNTER FOR SCREENING MAMMOGRAM FOR BREAST CANCER: ICD-10-CM

## 2021-08-06 DIAGNOSIS — E11.9 TYPE 2 DIABETES MELLITUS WITHOUT COMPLICATION, WITHOUT LONG-TERM CURRENT USE OF INSULIN (HCC): ICD-10-CM

## 2021-08-06 DIAGNOSIS — Z09 FOLLOW-UP EXAM, 3-6 MONTHS SINCE PREVIOUS EXAM: Primary | ICD-10-CM

## 2021-08-06 LAB
ALBUMIN SERPL-MCNC: 4.2 G/DL (ref 3.5–5.2)
ALBUMIN/GLOB SERPL: 1.5 G/DL
ALP SERPL-CCNC: 82 U/L (ref 39–117)
ALT SERPL W P-5'-P-CCNC: 42 U/L (ref 1–33)
ANION GAP SERPL CALCULATED.3IONS-SCNC: 10.2 MMOL/L (ref 5–15)
AST SERPL-CCNC: 34 U/L (ref 1–32)
BASOPHILS # BLD AUTO: 0.04 10*3/MM3 (ref 0–0.2)
BASOPHILS NFR BLD AUTO: 0.7 % (ref 0–1.5)
BILIRUB SERPL-MCNC: 0.5 MG/DL (ref 0–1.2)
BUN SERPL-MCNC: 10 MG/DL (ref 8–23)
BUN/CREAT SERPL: 13.2 (ref 7–25)
CALCIUM SPEC-SCNC: 9.4 MG/DL (ref 8.6–10.5)
CHLORIDE SERPL-SCNC: 102 MMOL/L (ref 98–107)
CHOLEST SERPL-MCNC: 263 MG/DL (ref 0–200)
CO2 SERPL-SCNC: 27.8 MMOL/L (ref 22–29)
CREAT SERPL-MCNC: 0.76 MG/DL (ref 0.57–1)
DEPRECATED RDW RBC AUTO: 44 FL (ref 37–54)
EOSINOPHIL # BLD AUTO: 0.09 10*3/MM3 (ref 0–0.4)
EOSINOPHIL NFR BLD AUTO: 1.5 % (ref 0.3–6.2)
ERYTHROCYTE [DISTWIDTH] IN BLOOD BY AUTOMATED COUNT: 12.8 % (ref 12.3–15.4)
GFR SERPL CREATININE-BSD FRML MDRD: 74 ML/MIN/1.73
GLOBULIN UR ELPH-MCNC: 2.8 GM/DL
GLUCOSE SERPL-MCNC: 129 MG/DL (ref 65–99)
HBA1C MFR BLD: 7.46 % (ref 4.8–5.6)
HCT VFR BLD AUTO: 47.9 % (ref 34–46.6)
HDLC SERPL-MCNC: 58 MG/DL (ref 40–60)
HGB BLD-MCNC: 15.2 G/DL (ref 12–15.9)
IMM GRANULOCYTES # BLD AUTO: 0.02 10*3/MM3 (ref 0–0.05)
IMM GRANULOCYTES NFR BLD AUTO: 0.3 % (ref 0–0.5)
LDLC SERPL CALC-MCNC: 167 MG/DL (ref 0–100)
LDLC/HDLC SERPL: 2.83 {RATIO}
LYMPHOCYTES # BLD AUTO: 2.26 10*3/MM3 (ref 0.7–3.1)
LYMPHOCYTES NFR BLD AUTO: 37.7 % (ref 19.6–45.3)
MCH RBC QN AUTO: 29.5 PG (ref 26.6–33)
MCHC RBC AUTO-ENTMCNC: 31.7 G/DL (ref 31.5–35.7)
MCV RBC AUTO: 92.8 FL (ref 79–97)
MONOCYTES # BLD AUTO: 0.41 10*3/MM3 (ref 0.1–0.9)
MONOCYTES NFR BLD AUTO: 6.8 % (ref 5–12)
NEUTROPHILS NFR BLD AUTO: 3.18 10*3/MM3 (ref 1.7–7)
NEUTROPHILS NFR BLD AUTO: 53 % (ref 42.7–76)
NRBC BLD AUTO-RTO: 0 /100 WBC (ref 0–0.2)
PLATELET # BLD AUTO: 204 10*3/MM3 (ref 140–450)
PMV BLD AUTO: 11.8 FL (ref 6–12)
POTASSIUM SERPL-SCNC: 4.3 MMOL/L (ref 3.5–5.2)
PROT SERPL-MCNC: 7 G/DL (ref 6–8.5)
RBC # BLD AUTO: 5.16 10*6/MM3 (ref 3.77–5.28)
SODIUM SERPL-SCNC: 140 MMOL/L (ref 136–145)
TRIGL SERPL-MCNC: 204 MG/DL (ref 0–150)
TSH SERPL DL<=0.05 MIU/L-ACNC: 1.43 UIU/ML (ref 0.27–4.2)
VLDLC SERPL-MCNC: 38 MG/DL (ref 5–40)
WBC # BLD AUTO: 6 10*3/MM3 (ref 3.4–10.8)

## 2021-08-06 PROCEDURE — 84443 ASSAY THYROID STIM HORMONE: CPT | Performed by: NURSE PRACTITIONER

## 2021-08-06 PROCEDURE — 99214 OFFICE O/P EST MOD 30 MIN: CPT | Performed by: NURSE PRACTITIONER

## 2021-08-06 PROCEDURE — 82043 UR ALBUMIN QUANTITATIVE: CPT | Performed by: NURSE PRACTITIONER

## 2021-08-06 PROCEDURE — 83036 HEMOGLOBIN GLYCOSYLATED A1C: CPT | Performed by: NURSE PRACTITIONER

## 2021-08-06 PROCEDURE — 80053 COMPREHEN METABOLIC PANEL: CPT | Performed by: NURSE PRACTITIONER

## 2021-08-06 PROCEDURE — 85025 COMPLETE CBC W/AUTO DIFF WBC: CPT | Performed by: NURSE PRACTITIONER

## 2021-08-06 PROCEDURE — 80061 LIPID PANEL: CPT | Performed by: NURSE PRACTITIONER

## 2021-08-06 RX ORDER — GLIMEPIRIDE 1 MG/1
1 TABLET ORAL DAILY
Qty: 90 TABLET | Refills: 1 | Status: SHIPPED | OUTPATIENT
Start: 2021-08-06 | End: 2021-08-10 | Stop reason: SDUPTHER

## 2021-08-06 RX ORDER — UBIDECARENONE 100 MG
1 CAPSULE ORAL DAILY
COMMUNITY
End: 2022-02-23

## 2021-08-06 RX ORDER — GLIMEPIRIDE 1 MG/1
1 TABLET ORAL DAILY
COMMUNITY
Start: 2021-07-24 | End: 2021-08-06 | Stop reason: SDUPTHER

## 2021-08-06 NOTE — PROGRESS NOTES
"Chief Complaint  Follow-up (6 mth F/U)    Subjective          Edna Alejandra presents to [facility]  Diabetes  She has type 2 diabetes mellitus. Her disease course has been stable. There are no hypoglycemic associated symptoms. There are no diabetic associated symptoms. There are no hypoglycemic complications. Symptoms are stable. There are no diabetic complications. Risk factors for coronary artery disease include diabetes mellitus, post-menopausal and obesity. She is compliant with treatment all of the time. Her weight is stable. She is following a generally healthy diet. When asked about meal planning, she reported none. She has not had a previous visit with a dietitian. Her overall blood glucose range is 110-130 mg/dl. An ACE inhibitor/angiotensin II receptor blocker is not being taken. Eye exam is not current.   Osteoarthritis  Presents for follow-up visit. She complains of pain. She reports no stiffness, joint swelling or joint warmth. The symptoms have been stable. Her pain is at a severity of 5/10. Her past medical history is significant for osteoarthritis.       Objective   Vital Signs:   /82   Pulse 81   Temp 98.5 °F (36.9 °C)   Ht 160 cm (63\")   Wt 98 kg (216 lb)   SpO2 95%   BMI 38.26 kg/m²     Physical Exam  Vitals reviewed.   Constitutional:       Appearance: Normal appearance. She is well-developed.   HENT:      Head: Normocephalic and atraumatic.   Eyes:      Conjunctiva/sclera: Conjunctivae normal.      Pupils: Pupils are equal, round, and reactive to light.   Cardiovascular:      Rate and Rhythm: Normal rate and regular rhythm.      Heart sounds: No murmur heard.   No friction rub. No gallop.    Pulmonary:      Effort: Pulmonary effort is normal.      Breath sounds: Normal breath sounds. No wheezing or rhonchi.   Skin:     General: Skin is warm and dry.   Neurological:      Mental Status: She is alert and oriented to person, place, and time. "   Psychiatric:         Mood and Affect: Mood and affect normal.         Behavior: Behavior normal.         Thought Content: Thought content normal.         Judgment: Judgment normal.        Result Review :   The following data was reviewed by: LIAM López on 08/06/2021:  CMP    CMP 2/5/21 8/6/21   Glucose  129 (A)   Glucose 138 (A)    BUN 11 10   Creatinine 0.79 0.76   eGFR Non African Am  74   Sodium 141 140   Potassium 4.1 4.3   Chloride 102 102   Calcium 9.8 9.4   Albumin 4.3 4.20   Total Bilirubin 0.52 0.5   Alkaline Phosphatase 75 82   AST (SGOT) 22 34 (A)   ALT (SGPT) 22 42 (A)   (A) Abnormal value            CBC w/diff    CBC w/Diff 2/5/21   WBC 5.40   RBC 5.17   Hemoglobin 15.2   Hematocrit 47.0   MCV 90.9   MCH 29.4   MCHC 32.3 (A)   RDW 13.0   Platelets 231   Neutrophil Rel % 50.1   Lymphocyte Rel % 39.4   Monocyte Rel % 8.1   Eosinophil Rel % 1.5   Basophil Rel % 0.7   (A) Abnormal value            Lipid Panel    Lipid Panel 2/5/21 8/6/21   Total Cholesterol  263 (A)   Total Cholesterol 252 (A)    Triglycerides 178 (A) 204 (A)   HDL Cholesterol 67 (A) 58   VLDL Cholesterol 36 38   LDL Cholesterol  149 (A) 167 (A)   LDL/HDL Ratio  2.83   (A) Abnormal value       Comments are available for some flowsheets but are not being displayed.           Most Recent A1C    HGBA1C Most Recent 8/6/21   Hemoglobin A1C 7.46 (A)   (A) Abnormal value                       Current Outpatient Medications on File Prior to Visit   Medication Sig Dispense Refill   • Cholecalciferol 25 MCG (1000 UT) capsule Take 1 capsule by mouth Daily.     • Cinnamon 500 MG capsule Take 2 capsules by mouth Daily.     • coenzyme Q10 100 MG capsule Take 1 capsule by mouth Daily.       No current facility-administered medications on file prior to visit.       Assessment and Plan    Diagnoses and all orders for this visit:    1. Follow-up exam, 3-6 months since previous exam (Primary)  -     CBC and differential  -     Comprehensive  metabolic panel  -     Hemoglobin A1c  -     Lipid panel  -     TSH  -     MicroAlbumin, Urine, Random - Urine, Clean Catch    2. Type 2 diabetes mellitus without complication, without long-term current use of insulin (CMS/[facility])  -     CBC and differential  -     Comprehensive metabolic panel  -     Hemoglobin A1c  -     Lipid panel  -     TSH  -     MicroAlbumin, Urine, Random - Urine, Clean Catch    3. Polyarthralgia    4. Encounter for screening mammogram for breast cancer  -     Mammo Screening Digital Tomosynthesis Bilateral With CAD; Future    Other orders  -     glimepiride (AMARYL) 1 MG tablet; Take 1 tablet by mouth Daily.  Dispense: 90 tablet; Refill: 1        Follow Up   No follow-ups on file.  Patient was given instructions and counseling regarding her condition or for health maintenance advice. Please see specific information pulled into the AVS if appropriate.

## 2021-08-07 LAB — ALBUMIN UR-MCNC: <1.2 MG/DL

## 2021-08-10 RX ORDER — PRAVASTATIN SODIUM 20 MG
20 TABLET ORAL NIGHTLY
Qty: 90 TABLET | Refills: 1 | Status: SHIPPED | OUTPATIENT
Start: 2021-08-10 | End: 2021-11-08

## 2021-08-10 RX ORDER — GLIMEPIRIDE 1 MG/1
1 TABLET ORAL 2 TIMES DAILY
Qty: 180 TABLET | Refills: 1 | Status: SHIPPED | OUTPATIENT
Start: 2021-08-10 | End: 2022-02-24 | Stop reason: SDUPTHER

## 2021-12-10 ENCOUNTER — APPOINTMENT (OUTPATIENT)
Dept: MAMMOGRAPHY | Facility: HOSPITAL | Age: 76
End: 2021-12-10

## 2022-02-23 ENCOUNTER — OFFICE VISIT (OUTPATIENT)
Dept: FAMILY MEDICINE CLINIC | Facility: CLINIC | Age: 77
End: 2022-02-23

## 2022-02-23 VITALS
OXYGEN SATURATION: 95 % | BODY MASS INDEX: 35.01 KG/M2 | TEMPERATURE: 97.3 F | HEIGHT: 63 IN | HEART RATE: 90 BPM | WEIGHT: 197.6 LBS | SYSTOLIC BLOOD PRESSURE: 138 MMHG | DIASTOLIC BLOOD PRESSURE: 88 MMHG

## 2022-02-23 DIAGNOSIS — Z09 FOLLOW-UP EXAM, 3-6 MONTHS SINCE PREVIOUS EXAM: Primary | ICD-10-CM

## 2022-02-23 DIAGNOSIS — E55.9 VITAMIN D DEFICIENCY: ICD-10-CM

## 2022-02-23 DIAGNOSIS — M25.50 POLYARTHRALGIA: ICD-10-CM

## 2022-02-23 DIAGNOSIS — E78.2 MIXED HYPERLIPIDEMIA: ICD-10-CM

## 2022-02-23 DIAGNOSIS — E11.9 TYPE 2 DIABETES MELLITUS WITHOUT COMPLICATION, WITHOUT LONG-TERM CURRENT USE OF INSULIN: ICD-10-CM

## 2022-02-23 LAB
25(OH)D3 SERPL-MCNC: 91.4 NG/ML
ALBUMIN SERPL-MCNC: 4.6 G/DL (ref 3.5–5.2)
ALBUMIN UR-MCNC: 1.3 MG/DL
ALBUMIN/GLOB SERPL: 2.4 G/DL
ALP SERPL-CCNC: 80 U/L (ref 39–117)
ALT SERPL W P-5'-P-CCNC: 39 U/L (ref 1–33)
ANION GAP SERPL CALCULATED.3IONS-SCNC: 11.8 MMOL/L (ref 5–15)
AST SERPL-CCNC: 38 U/L (ref 1–32)
BASOPHILS # BLD AUTO: 0.04 10*3/MM3 (ref 0–0.2)
BASOPHILS NFR BLD AUTO: 0.8 % (ref 0–1.5)
BILIRUB SERPL-MCNC: 0.6 MG/DL (ref 0–1.2)
BUN SERPL-MCNC: 7 MG/DL (ref 8–23)
BUN/CREAT SERPL: 10.9 (ref 7–25)
CALCIUM SPEC-SCNC: 10.1 MG/DL (ref 8.6–10.5)
CHLORIDE SERPL-SCNC: 101 MMOL/L (ref 98–107)
CHOLEST SERPL-MCNC: 214 MG/DL (ref 0–200)
CO2 SERPL-SCNC: 26.2 MMOL/L (ref 22–29)
CREAT SERPL-MCNC: 0.64 MG/DL (ref 0.57–1)
DEPRECATED RDW RBC AUTO: 44.1 FL (ref 37–54)
EOSINOPHIL # BLD AUTO: 0.07 10*3/MM3 (ref 0–0.4)
EOSINOPHIL NFR BLD AUTO: 1.3 % (ref 0.3–6.2)
ERYTHROCYTE [DISTWIDTH] IN BLOOD BY AUTOMATED COUNT: 13.7 % (ref 12.3–15.4)
GFR SERPL CREATININE-BSD FRML MDRD: 91 ML/MIN/1.73
GLOBULIN UR ELPH-MCNC: 1.9 GM/DL
GLUCOSE SERPL-MCNC: 129 MG/DL (ref 65–99)
HBA1C MFR BLD: 6 % (ref 4.8–5.6)
HCT VFR BLD AUTO: 46 % (ref 34–46.6)
HDLC SERPL-MCNC: 63 MG/DL (ref 40–60)
HGB BLD-MCNC: 15.5 G/DL (ref 12–15.9)
IMM GRANULOCYTES # BLD AUTO: 0.01 10*3/MM3 (ref 0–0.05)
IMM GRANULOCYTES NFR BLD AUTO: 0.2 % (ref 0–0.5)
LDLC SERPL CALC-MCNC: 132 MG/DL (ref 0–100)
LDLC/HDLC SERPL: 2.05 {RATIO}
LYMPHOCYTES # BLD AUTO: 1.67 10*3/MM3 (ref 0.7–3.1)
LYMPHOCYTES NFR BLD AUTO: 31.6 % (ref 19.6–45.3)
MCH RBC QN AUTO: 30.2 PG (ref 26.6–33)
MCHC RBC AUTO-ENTMCNC: 33.7 G/DL (ref 31.5–35.7)
MCV RBC AUTO: 89.5 FL (ref 79–97)
MONOCYTES # BLD AUTO: 0.45 10*3/MM3 (ref 0.1–0.9)
MONOCYTES NFR BLD AUTO: 8.5 % (ref 5–12)
NEUTROPHILS NFR BLD AUTO: 3.04 10*3/MM3 (ref 1.7–7)
NEUTROPHILS NFR BLD AUTO: 57.6 % (ref 42.7–76)
NRBC BLD AUTO-RTO: 0 /100 WBC (ref 0–0.2)
PLATELET # BLD AUTO: 250 10*3/MM3 (ref 140–450)
PMV BLD AUTO: 11.4 FL (ref 6–12)
POTASSIUM SERPL-SCNC: 4 MMOL/L (ref 3.5–5.2)
PROT SERPL-MCNC: 6.5 G/DL (ref 6–8.5)
RBC # BLD AUTO: 5.14 10*6/MM3 (ref 3.77–5.28)
SODIUM SERPL-SCNC: 139 MMOL/L (ref 136–145)
TRIGL SERPL-MCNC: 110 MG/DL (ref 0–150)
TSH SERPL DL<=0.05 MIU/L-ACNC: 1.57 UIU/ML (ref 0.27–4.2)
VLDLC SERPL-MCNC: 19 MG/DL (ref 5–40)
WBC NRBC COR # BLD: 5.28 10*3/MM3 (ref 3.4–10.8)

## 2022-02-23 PROCEDURE — 99214 OFFICE O/P EST MOD 30 MIN: CPT | Performed by: NURSE PRACTITIONER

## 2022-02-23 PROCEDURE — 80053 COMPREHEN METABOLIC PANEL: CPT | Performed by: NURSE PRACTITIONER

## 2022-02-23 PROCEDURE — 80061 LIPID PANEL: CPT | Performed by: NURSE PRACTITIONER

## 2022-02-23 PROCEDURE — 84443 ASSAY THYROID STIM HORMONE: CPT | Performed by: NURSE PRACTITIONER

## 2022-02-23 PROCEDURE — 85025 COMPLETE CBC W/AUTO DIFF WBC: CPT | Performed by: NURSE PRACTITIONER

## 2022-02-23 PROCEDURE — 83036 HEMOGLOBIN GLYCOSYLATED A1C: CPT | Performed by: NURSE PRACTITIONER

## 2022-02-23 PROCEDURE — 82306 VITAMIN D 25 HYDROXY: CPT | Performed by: NURSE PRACTITIONER

## 2022-02-23 PROCEDURE — 82043 UR ALBUMIN QUANTITATIVE: CPT | Performed by: NURSE PRACTITIONER

## 2022-02-23 RX ORDER — UBIDECARENONE 100 MG
100 CAPSULE ORAL DAILY
COMMUNITY

## 2022-02-23 RX ORDER — VITAMIN B COMPLEX
1 CAPSULE ORAL DAILY
COMMUNITY

## 2022-02-23 RX ORDER — ASCORBIC ACID 500 MG
500 TABLET ORAL 2 TIMES DAILY
COMMUNITY

## 2022-02-23 RX ORDER — TURMERIC ROOT EXTRACT 500 MG
1 TABLET ORAL DAILY
COMMUNITY

## 2022-02-23 RX ORDER — VITAMIN E 268 MG
400 CAPSULE ORAL DAILY
COMMUNITY

## 2022-02-23 NOTE — PROGRESS NOTES
Chief Complaint  Diabetes and Hyperlipidemia    Subjective          Medical History: has a past medical history of Allergies, Anemia (1982), Arthritis, Cataracts, bilateral, Diabetes (HCC), Esophageal reflux disease, Forgetfulness, Gall stones (01/1985), Head injury (1964), Hemorrhoids (1967), HLD (hyperlipidemia), Seizures (HCC), Sinus trouble, and SOB (shortness of breath).     Surgical History: has a past surgical history that includes Appendectomy (04/1966); Breast biopsy (Right, 08/2013); Cataract extraction (05/2018); Colonoscopy; Esophagogastroduodenoscopy (2019); TOENAIL EXCISION (Left, 6/1965, 2008, 2016); Gallbladder surgery (01/1985); Hysterectomy (06/1984); and Tonsillectomy (05/1965).     Family History: family history includes Diabetes in an other family member; Heart disease in an other family member.     Social History: reports that she has never smoked. She has never used smokeless tobacco.    Edna Alejandra presents to [facility]  Patient is a 76-year-old female who comes in today for 6-month follow-up.  She appears younger than stated age.  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  Over the past 6 months patient states that she has changed her diet and lifestyle changes.  She states she is quit drinking soda even diet soda, only eats all natural foods, and no carbs.  She stays well-hydrated.  She has lost close to 20 pounds since she has started the lifestyle changes.  She states she feels much better.  She states her joint pain has improved.  She states she has more energy.    Patient states she has decreased her diabetic medication from 2 times a day to 1 time a day.  She did bring a blood sugar chart the did show that her blood sugars typically range below 120 even on the 1 time a day diabetic medicine.  Patient does not take the pravastatin that was prescribed for her for her cholesterol.  She is hoping that that lifestyle changes  will bring down her cholesterol naturally and does not want to be placed on any cholesterol medication.    This is a chronic problem.  Current episode has been for longer than 6 months.  Problem has gradually been improving since she has been on vitamin D supplements.  She denies any excessive fatigue, hair loss, skin changes due to the vitamin D deficiency.  She tries to eat a well-balanced diet.  She has been on the vitamin D weekly, no compliance problems.  Condition is stable.      Joint Pain  This is a recurrent problem. The current episode started more than 1 month ago. The problem occurs intermittently. The problem has been waxing and waning. Associated symptoms include arthralgias. Pertinent negatives include no fatigue. She has tried NSAIDs (Diet changes) for the symptoms. The treatment provided moderate relief.   Diabetes  She presents for her follow-up diabetic visit. She has type 2 diabetes mellitus. Her disease course has been fluctuating. There are no hypoglycemic associated symptoms. Pertinent negatives for diabetes include no blurred vision, no fatigue, no polydipsia, no polyphagia and no polyuria. There are no hypoglycemic complications. Symptoms are improving. There are no diabetic complications. Risk factors for coronary artery disease include diabetes mellitus, dyslipidemia, obesity and post-menopausal. Current diabetic treatment includes oral agent (monotherapy). She is compliant with treatment most of the time. Her weight is decreasing steadily. She is following a diabetic and generally healthy diet. Meal planning includes carbohydrate counting, avoidance of concentrated sweets, calorie counting and ADA exchanges. She has not had a previous visit with a dietitian. She participates in exercise three times a week. Her home blood glucose trend is decreasing steadily. Her overall blood glucose range is 110-130 mg/dl. An ACE inhibitor/angiotensin II receptor blocker is not being taken. She does not  "see a podiatrist.Eye exam is not current.   Hyperlipidemia  This is a chronic problem. The current episode started more than 1 year ago. Recent lipid tests were reviewed and are variable. Exacerbating diseases include diabetes and obesity. Current antihyperlipidemic treatment includes statins. The current treatment provides no improvement of lipids. Compliance problems include medication side effects (will not take cholesterol medication).  Risk factors for coronary artery disease include diabetes mellitus, dyslipidemia, obesity and post-menopausal.       Objective   Vital Signs:   /88 (BP Location: Left arm, Patient Position: Sitting, Cuff Size: Adult)   Pulse 90   Temp 97.3 °F (36.3 °C) (Temporal)   Ht 160 cm (63\")   Wt 89.6 kg (197 lb 9.6 oz)   SpO2 95%   BMI 35.00 kg/m²     Physical Exam  Vitals reviewed.   Constitutional:       Appearance: Normal appearance. She is well-developed. She is obese.   HENT:      Head: Normocephalic and atraumatic.   Eyes:      Conjunctiva/sclera: Conjunctivae normal.      Pupils: Pupils are equal, round, and reactive to light.   Cardiovascular:      Rate and Rhythm: Normal rate and regular rhythm.      Heart sounds: No murmur heard.      Pulmonary:      Effort: Pulmonary effort is normal.      Breath sounds: Normal breath sounds. No wheezing or rhonchi.   Skin:     General: Skin is warm and dry.   Neurological:      Mental Status: She is alert and oriented to person, place, and time.   Psychiatric:         Mood and Affect: Mood and affect normal.         Behavior: Behavior normal.         Thought Content: Thought content normal.         Judgment: Judgment normal.        Result Review :   The following data was reviewed by: LIAM López on 02/23/2022:  Common labs    Common Labsle 8/6/21 8/6/21 8/6/21 8/6/21 8/6/21    1549 1549 1549 1549 1549   Glucose  129 (A)      BUN  10      Creatinine  0.76      eGFR Non African Am  74      Sodium  140      Potassium  " 4.3      Chloride  102      Calcium  9.4      Albumin  4.20      Total Bilirubin  0.5      Alkaline Phosphatase  82      AST (SGOT)  34 (A)      ALT (SGPT)  42 (A)      WBC 6.00       Hemoglobin 15.2       Hematocrit 47.9 (A)       Platelets 204       Total Cholesterol   263 (A)     Triglycerides   204 (A)     HDL Cholesterol   58     LDL Cholesterol    167 (A)     Hemoglobin A1C    7.46 (A)    Microalbumin, Urine     <1.2   (A) Abnormal value                        Current Outpatient Medications on File Prior to Visit   Medication Sig Dispense Refill   • B Complex Vitamins (vitamin b complex) capsule capsule Take 1 capsule by mouth Daily.     • CINNAMON PO Take 1 capsule by mouth Daily.     • coenzyme Q10 100 MG capsule Take 100 mg by mouth Daily.     • MAGNESIUM OXIDE PO Take 1 capsule by mouth Daily.     • Turmeric 500 MG tablet Take 1 tablet by mouth Daily.     • vitamin C (ASCORBIC ACID) 500 MG tablet Take 500 mg by mouth 2 (Two) Times a Day.     • vitamin D3 (vitamin d) 125 MCG (5000 UT) capsule capsule Take 1 capsule by mouth 2 (Two) Times a Day.     • vitamin E 400 UNIT capsule Take 400 Units by mouth Daily.     • Zinc 50 MG capsule Take 1 each by mouth 2 (Two) Times a Day.     • glimepiride (AMARYL) 1 MG tablet Take 1 tablet by mouth 2 (two) times a day for 90 days. (Patient taking differently: Take 1 mg by mouth Daily.) 180 tablet 1   • pravastatin (PRAVACHOL) 20 MG tablet Take 1 tablet by mouth Every Night for 90 days. 90 tablet 1   • [DISCONTINUED] coenzyme Q10 100 MG capsule Take 1 capsule by mouth Daily.       No current facility-administered medications on file prior to visit.        Assessment and Plan    Diagnoses and all orders for this visit:    1. Follow-up exam, 3-6 months since previous exam (Primary)    2. Type 2 diabetes mellitus without complication, without long-term current use of insulin (HCC)  Comments:  Blood sugars have been more stable since starting the lifestyle change for recheck  A1c if remains stable and has gone down we will continue on the gliperide day  Orders:  -     CBC Auto Differential  -     Comprehensive Metabolic Panel  -     Hemoglobin A1c  -     Lipid Panel  -     MicroAlbumin, Urine, Random - Urine, Clean Catch  -     TSH  -     Vitamin D 25 Hydroxy    3. Mixed hyperlipidemia  Comments:  will recheck cholesterol levels, patient currently not on any medications but is trying to change it with diet alone.  Orders:  -     Hemoglobin A1c    4. Vitamin D deficiency  Comments:  Currently on vitamin D supplement will recheck labs, adjust medication if needed  Orders:  -     Vitamin D 25 Hydroxy    5. Polyarthralgia  Comments:  Improving greatly since change of diet and stopped eating processed foods.  We will continue to monitor patient's progress.        Follow Up   Return in about 6 months (around 8/23/2022) for Recheck.  Patient was given instructions and counseling regarding her condition or for health maintenance advice. Please see specific information pulled into the AVS if appropriate.

## 2022-02-24 RX ORDER — GLIMEPIRIDE 1 MG/1
1 TABLET ORAL DAILY
Qty: 60 TABLET | Refills: 1 | Status: SHIPPED | OUTPATIENT
Start: 2022-02-24 | End: 2022-02-28

## 2022-02-28 RX ORDER — GLIMEPIRIDE 1 MG/1
TABLET ORAL
Qty: 180 TABLET | Refills: 1 | Status: SHIPPED | OUTPATIENT
Start: 2022-02-28 | End: 2022-08-29

## 2022-08-24 ENCOUNTER — OFFICE VISIT (OUTPATIENT)
Dept: FAMILY MEDICINE CLINIC | Facility: CLINIC | Age: 77
End: 2022-08-24

## 2022-08-24 VITALS
DIASTOLIC BLOOD PRESSURE: 92 MMHG | WEIGHT: 200.8 LBS | OXYGEN SATURATION: 96 % | HEART RATE: 83 BPM | BODY MASS INDEX: 35.58 KG/M2 | TEMPERATURE: 97.1 F | HEIGHT: 63 IN | SYSTOLIC BLOOD PRESSURE: 152 MMHG

## 2022-08-24 DIAGNOSIS — Z09 FOLLOW-UP EXAM, 3-6 MONTHS SINCE PREVIOUS EXAM: Primary | ICD-10-CM

## 2022-08-24 DIAGNOSIS — E78.2 MIXED HYPERLIPIDEMIA: ICD-10-CM

## 2022-08-24 DIAGNOSIS — E55.9 VITAMIN D DEFICIENCY: ICD-10-CM

## 2022-08-24 DIAGNOSIS — E11.9 TYPE 2 DIABETES MELLITUS WITHOUT COMPLICATION, WITHOUT LONG-TERM CURRENT USE OF INSULIN: ICD-10-CM

## 2022-08-24 PROBLEM — K21.9 ESOPHAGEAL REFLUX: Status: ACTIVE | Noted: 2022-08-24

## 2022-08-24 PROBLEM — D64.9 ANEMIA: Status: ACTIVE | Noted: 2022-08-24

## 2022-08-24 PROBLEM — H26.9 CATARACTS, BILATERAL: Status: ACTIVE | Noted: 2022-08-24

## 2022-08-24 PROBLEM — R68.89 FORGETFULNESS: Status: ACTIVE | Noted: 2022-08-24

## 2022-08-24 PROBLEM — Z12.39 SCREENING FOR BREAST CANCER: Status: ACTIVE | Noted: 2019-12-19

## 2022-08-24 PROBLEM — R56.9 SEIZURES: Status: ACTIVE | Noted: 2022-08-24

## 2022-08-24 PROBLEM — R06.02 SHORTNESS OF BREATH: Status: ACTIVE | Noted: 2022-08-24

## 2022-08-24 PROBLEM — J34.9 SINUS TROUBLE: Status: ACTIVE | Noted: 2022-08-24

## 2022-08-24 PROBLEM — J01.80 OTHER ACUTE SINUSITIS: Status: ACTIVE | Noted: 2022-08-24

## 2022-08-24 PROBLEM — E78.5 HYPERLIPIDEMIA: Status: ACTIVE | Noted: 2022-08-24

## 2022-08-24 PROBLEM — K64.9 HEMORRHOIDS: Status: ACTIVE | Noted: 2022-08-24

## 2022-08-24 PROBLEM — M19.90 ARTHRITIS: Status: ACTIVE | Noted: 2022-08-24

## 2022-08-24 PROBLEM — S09.90XA HEAD INJURY: Status: ACTIVE | Noted: 2022-08-24

## 2022-08-24 LAB
25(OH)D3 SERPL-MCNC: 86.2 NG/ML (ref 30–100)
ALBUMIN SERPL-MCNC: 4.3 G/DL (ref 3.5–5.2)
ALBUMIN/GLOB SERPL: 2 G/DL
ALP SERPL-CCNC: 73 U/L (ref 39–117)
ALT SERPL W P-5'-P-CCNC: 22 U/L (ref 1–33)
ANION GAP SERPL CALCULATED.3IONS-SCNC: 10 MMOL/L (ref 5–15)
AST SERPL-CCNC: 28 U/L (ref 1–32)
BASOPHILS # BLD AUTO: 0.03 10*3/MM3 (ref 0–0.2)
BASOPHILS NFR BLD AUTO: 0.5 % (ref 0–1.5)
BILIRUB SERPL-MCNC: 0.7 MG/DL (ref 0–1.2)
BUN SERPL-MCNC: 11 MG/DL (ref 8–23)
BUN/CREAT SERPL: 14.5 (ref 7–25)
CALCIUM SPEC-SCNC: 9.7 MG/DL (ref 8.6–10.5)
CHLORIDE SERPL-SCNC: 102 MMOL/L (ref 98–107)
CHOLEST SERPL-MCNC: 227 MG/DL (ref 0–200)
CO2 SERPL-SCNC: 28 MMOL/L (ref 22–29)
CREAT SERPL-MCNC: 0.76 MG/DL (ref 0.57–1)
DEPRECATED RDW RBC AUTO: 43 FL (ref 37–54)
EGFRCR SERPLBLD CKD-EPI 2021: 81.8 ML/MIN/1.73
EOSINOPHIL # BLD AUTO: 0.05 10*3/MM3 (ref 0–0.4)
EOSINOPHIL NFR BLD AUTO: 0.8 % (ref 0.3–6.2)
ERYTHROCYTE [DISTWIDTH] IN BLOOD BY AUTOMATED COUNT: 12.9 % (ref 12.3–15.4)
GLOBULIN UR ELPH-MCNC: 2.2 GM/DL
GLUCOSE SERPL-MCNC: 93 MG/DL (ref 65–99)
HBA1C MFR BLD: 6.8 % (ref 4.8–5.6)
HCT VFR BLD AUTO: 46.3 % (ref 34–46.6)
HDLC SERPL-MCNC: 68 MG/DL (ref 40–60)
HGB BLD-MCNC: 14.9 G/DL (ref 12–15.9)
IMM GRANULOCYTES # BLD AUTO: 0.01 10*3/MM3 (ref 0–0.05)
IMM GRANULOCYTES NFR BLD AUTO: 0.2 % (ref 0–0.5)
LDLC SERPL CALC-MCNC: 143 MG/DL (ref 0–100)
LDLC/HDLC SERPL: 2.07 {RATIO}
LYMPHOCYTES # BLD AUTO: 1.94 10*3/MM3 (ref 0.7–3.1)
LYMPHOCYTES NFR BLD AUTO: 32.4 % (ref 19.6–45.3)
MCH RBC QN AUTO: 29.3 PG (ref 26.6–33)
MCHC RBC AUTO-ENTMCNC: 32.2 G/DL (ref 31.5–35.7)
MCV RBC AUTO: 91.1 FL (ref 79–97)
MONOCYTES # BLD AUTO: 0.41 10*3/MM3 (ref 0.1–0.9)
MONOCYTES NFR BLD AUTO: 6.8 % (ref 5–12)
NEUTROPHILS NFR BLD AUTO: 3.55 10*3/MM3 (ref 1.7–7)
NEUTROPHILS NFR BLD AUTO: 59.3 % (ref 42.7–76)
NRBC BLD AUTO-RTO: 0 /100 WBC (ref 0–0.2)
PLATELET # BLD AUTO: 250 10*3/MM3 (ref 140–450)
PMV BLD AUTO: 10.6 FL (ref 6–12)
POTASSIUM SERPL-SCNC: 4 MMOL/L (ref 3.5–5.2)
PROT SERPL-MCNC: 6.5 G/DL (ref 6–8.5)
RBC # BLD AUTO: 5.08 10*6/MM3 (ref 3.77–5.28)
SODIUM SERPL-SCNC: 140 MMOL/L (ref 136–145)
TRIGL SERPL-MCNC: 91 MG/DL (ref 0–150)
TSH SERPL DL<=0.05 MIU/L-ACNC: 1.03 UIU/ML (ref 0.27–4.2)
VLDLC SERPL-MCNC: 16 MG/DL (ref 5–40)
WBC NRBC COR # BLD: 5.99 10*3/MM3 (ref 3.4–10.8)

## 2022-08-24 PROCEDURE — 80053 COMPREHEN METABOLIC PANEL: CPT | Performed by: NURSE PRACTITIONER

## 2022-08-24 PROCEDURE — 36415 COLL VENOUS BLD VENIPUNCTURE: CPT | Performed by: NURSE PRACTITIONER

## 2022-08-24 PROCEDURE — 99214 OFFICE O/P EST MOD 30 MIN: CPT | Performed by: NURSE PRACTITIONER

## 2022-08-24 PROCEDURE — 83036 HEMOGLOBIN GLYCOSYLATED A1C: CPT | Performed by: NURSE PRACTITIONER

## 2022-08-24 PROCEDURE — 84443 ASSAY THYROID STIM HORMONE: CPT | Performed by: NURSE PRACTITIONER

## 2022-08-24 PROCEDURE — 85025 COMPLETE CBC W/AUTO DIFF WBC: CPT | Performed by: NURSE PRACTITIONER

## 2022-08-24 PROCEDURE — 80061 LIPID PANEL: CPT | Performed by: NURSE PRACTITIONER

## 2022-08-24 PROCEDURE — 82306 VITAMIN D 25 HYDROXY: CPT | Performed by: NURSE PRACTITIONER

## 2022-08-24 NOTE — PROGRESS NOTES
Chief Complaint  Diabetes    Subjective          Medical History: has a past medical history of Allergies, Anemia (1982), Arthritis, Cataracts, bilateral, Diabetes (HCC), Esophageal reflux disease, Forgetfulness, Gall stones (01/1985), Head injury (1964), Hemorrhoids (1967), HLD (hyperlipidemia), Seizures (HCC), Sinus trouble, and SOB (shortness of breath).     Surgical History: has a past surgical history that includes Appendectomy (04/1966); Breast biopsy (Right, 08/2013); Cataract extraction (05/2018); Colonoscopy; Esophagogastroduodenoscopy (2019); TOENAIL EXCISION (Left, 6/1965, 2008, 2016); Gallbladder surgery (01/1985); Hysterectomy (06/1984); and Tonsillectomy (05/1965).     Family History: family history includes Diabetes in an other family member; Heart disease in an other family member.     Social History: reports that she has never smoked. She has never used smokeless tobacco.    Edna Alejandra presents to [facility]  Diabetes  She presents for her follow-up diabetic visit. She has type 2 diabetes mellitus. Her disease course has been fluctuating. There are no hypoglycemic associated symptoms. Pertinent negatives for diabetes include no blurred vision, no fatigue, no polydipsia, no polyphagia and no polyuria. There are no hypoglycemic complications. Symptoms are improving. There are no diabetic complications. Risk factors for coronary artery disease include diabetes mellitus, dyslipidemia, obesity and post-menopausal. Current diabetic treatment includes oral agent (monotherapy). She is compliant with treatment most of the time. Her weight is decreasing steadily. She is following a diabetic and generally healthy diet. Meal planning includes carbohydrate counting, avoidance of concentrated sweets, calorie counting and ADA exchanges. She has not had a previous visit with a dietitian. She participates in exercise three times a week. Her home blood glucose trend is decreasing  "steadily. Her overall blood glucose range is 110-130 mg/dl. An ACE inhibitor/angiotensin II receptor blocker is not being taken. She does not see a podiatrist.Eye exam is not current.       Hyperlipidemia  This is a chronic problem. The current episode started more than 1 year ago. Recent lipid tests were reviewed and are variable. Exacerbating diseases include diabetes and obesity. Current antihyperlipidemic treatment includes statins. The current treatment provides no improvement of lipids. Compliance problems include medication side effects (will not take cholesterol medication).  Risk factors for coronary artery disease include diabetes mellitus, dyslipidemia, obesity and post-menopausal.          Objective   Vital Signs:   /92 (BP Location: Right arm, Patient Position: Sitting, Cuff Size: Adult)   Pulse 83   Temp 97.1 °F (36.2 °C) (Temporal)   Ht 160 cm (63\")   Wt 91.1 kg (200 lb 12.8 oz)   SpO2 96%   BMI 35.57 kg/m²     Physical Exam  Vitals reviewed.   Constitutional:       Appearance: Normal appearance. She is well-developed. She is obese.   HENT:      Head: Normocephalic and atraumatic.      Left Ear: External ear normal.   Eyes:      Conjunctiva/sclera: Conjunctivae normal.      Pupils: Pupils are equal, round, and reactive to light.   Cardiovascular:      Rate and Rhythm: Normal rate and regular rhythm.      Heart sounds: No murmur heard.    No friction rub.   Pulmonary:      Effort: Pulmonary effort is normal.      Breath sounds: Normal breath sounds. No wheezing or rhonchi.   Skin:     General: Skin is warm and dry.   Neurological:      Mental Status: She is alert and oriented to person, place, and time.   Psychiatric:         Mood and Affect: Mood and affect normal.         Behavior: Behavior normal.         Thought Content: Thought content normal.         Judgment: Judgment normal.        Result Review :   The following data was reviewed by: LIAM López on " 08/24/2022:  Common labs    Common Labsle 2/23/22 2/23/22 2/23/22 2/23/22 2/23/22 8/24/22 8/24/22 8/24/22 8/24/22    0908 0908 0908 0908 0908 1625 1625 1625 1625   Glucose    129 (A)    93    BUN    7 (A)    11    Creatinine    0.64    0.76    eGFR Non African Am    91        Sodium    139    140    Potassium    4.0    4.0    Chloride    101    102    Calcium    10.1    9.7    Albumin    4.60    4.30    Total Bilirubin    0.6    0.7    Alkaline Phosphatase    80    73    AST (SGOT)    38 (A)    28    ALT (SGPT)    39 (A)    22    WBC 5.28     5.99      Hemoglobin 15.5     14.9      Hematocrit 46.0     46.3      Platelets 250     250      Total Cholesterol     214 (A)    227 (A)   Triglycerides     110    91   HDL Cholesterol     63 (A)    68 (A)   LDL Cholesterol      132 (A)    143 (A)   Hemoglobin A1C  6.00 (A)     6.80 (A)     Microalbumin, Urine   1.3         (A) Abnormal value            Data reviewed: Previous office note            Current Outpatient Medications on File Prior to Visit   Medication Sig Dispense Refill   • B Complex Vitamins (vitamin b complex) capsule capsule Take 1 capsule by mouth Daily.     • CINNAMON PO Take 1 capsule by mouth Daily.     • coenzyme Q10 100 MG capsule Take 100 mg by mouth Daily.     • glimepiride (AMARYL) 1 MG tablet TAKE 1 TABLET BY MOUTH TWICE A  tablet 1   • MAGNESIUM OXIDE PO Take 1 capsule by mouth Daily.     • Turmeric 500 MG tablet Take 1 tablet by mouth Daily.     • vitamin C (ASCORBIC ACID) 500 MG tablet Take 500 mg by mouth 2 (Two) Times a Day.     • vitamin D3 125 MCG (5000 UT) capsule capsule Take 1 capsule by mouth 2 (Two) Times a Day.     • vitamin E 400 UNIT capsule Take 400 Units by mouth Daily.     • Zinc 50 MG capsule Take 1 each by mouth 2 (Two) Times a Day.     • pravastatin (PRAVACHOL) 20 MG tablet Take 1 tablet by mouth Every Night for 90 days. 90 tablet 1     No current facility-administered medications on file prior to visit.        Assessment  and Plan    Diagnoses and all orders for this visit:    1. Follow-up exam, 3-6 months since previous exam (Primary)    2. Type 2 diabetes mellitus without complication, without long-term current use of insulin (HCC)  Comments:  Patient's diabetes is well controlled, we have already cut her diabetic medication in half.  Last A1c was 6.0 if continues to be well controlled A1c 6 or below we will discontinue medication and try diet alone.  Patient verbalized understanding is agreeable treatment plan.  Orders:  -     CBC Auto Differential  -     Comprehensive Metabolic Panel  -     Hemoglobin A1c  -     Lipid Panel  -     TSH    3. Vitamin D deficiency  -     Vitamin D 25 Hydroxy    4. Mixed hyperlipidemia  Comments:  Patient is noncompliant with medication, will not take cholesterol medication we have had multiple discussions we will continue to monitor patient's cholesterol        Follow Up   Return in about 6 months (around 2/24/2023) for Recheck.  Patient was given instructions and counseling regarding her condition or for health maintenance advice. Please see specific information pulled into the AVS if appropriate.

## 2022-08-29 RX ORDER — GLIMEPIRIDE 1 MG/1
TABLET ORAL
Qty: 90 TABLET | Refills: 1 | Status: SHIPPED | OUTPATIENT
Start: 2022-08-29 | End: 2023-02-23

## 2022-09-13 ENCOUNTER — TELEPHONE (OUTPATIENT)
Dept: FAMILY MEDICINE CLINIC | Facility: CLINIC | Age: 77
End: 2022-09-13

## 2022-09-13 NOTE — TELEPHONE ENCOUNTER
Caller: [employer]    Relationship to patient: INSURANCE PROVIDER    Best call back number: [phone]    Patient is needing: INSURANCE IS WANTING TO SET UP STATIN THERAPY DUE TO THE PATIENT HAS DIABETIS.

## 2023-02-23 RX ORDER — GLIMEPIRIDE 1 MG/1
TABLET ORAL
Qty: 180 TABLET | Refills: 1 | Status: SHIPPED | OUTPATIENT
Start: 2023-02-23

## 2023-02-28 ENCOUNTER — TELEPHONE (OUTPATIENT)
Dept: FAMILY MEDICINE CLINIC | Facility: CLINIC | Age: 78
End: 2023-02-28
Payer: MEDICARE

## 2023-02-28 NOTE — TELEPHONE ENCOUNTER
Patient had a Same Day Cancellation on 02/27/2023 0845 with Alexandria Herron. Called number in chart. No answer, letf message explaining policy concerning missed appointments and same day cancellations.

## 2023-08-14 RX ORDER — GLIMEPIRIDE 1 MG/1
TABLET ORAL
Qty: 180 TABLET | Refills: 1 | OUTPATIENT
Start: 2023-08-14

## 2023-09-15 RX ORDER — GLIMEPIRIDE 1 MG/1
TABLET ORAL
Qty: 180 TABLET | Refills: 1 | OUTPATIENT
Start: 2023-09-15

## 2023-10-02 RX ORDER — GLIMEPIRIDE 1 MG/1
TABLET ORAL
Qty: 180 TABLET | Refills: 1 | OUTPATIENT
Start: 2023-10-02

## 2023-12-25 ENCOUNTER — APPOINTMENT (OUTPATIENT)
Dept: CT IMAGING | Facility: HOSPITAL | Age: 78
End: 2023-12-25
Payer: MEDICARE

## 2023-12-25 ENCOUNTER — HOSPITAL ENCOUNTER (EMERGENCY)
Facility: HOSPITAL | Age: 78
Discharge: HOME | End: 2023-12-25
Attending: EMERGENCY MEDICINE | Admitting: EMERGENCY MEDICINE
Payer: MEDICARE

## 2023-12-25 VITALS
DIASTOLIC BLOOD PRESSURE: 66 MMHG | TEMPERATURE: 97.5 F | BODY MASS INDEX: 36.6 KG/M2 | RESPIRATION RATE: 18 BRPM | HEIGHT: 63 IN | HEART RATE: 55 BPM | OXYGEN SATURATION: 95 % | WEIGHT: 206.57 LBS | SYSTOLIC BLOOD PRESSURE: 132 MMHG

## 2023-12-25 DIAGNOSIS — K86.89 PANCREATIC MASS: Primary | ICD-10-CM

## 2023-12-25 DIAGNOSIS — R10.9 ABDOMINAL PAIN, UNSPECIFIED ABDOMINAL LOCATION: ICD-10-CM

## 2023-12-25 LAB
ALBUMIN SERPL-MCNC: 4 G/DL (ref 3.5–5.2)
ALBUMIN/GLOB SERPL: 1.3 G/DL
ALP SERPL-CCNC: 85 U/L (ref 39–117)
ALT SERPL W P-5'-P-CCNC: 13 U/L (ref 1–33)
ANION GAP SERPL CALCULATED.3IONS-SCNC: 10.5 MMOL/L (ref 5–15)
AST SERPL-CCNC: 16 U/L (ref 1–32)
BASOPHILS # BLD AUTO: 0.02 10*3/MM3 (ref 0–0.2)
BASOPHILS NFR BLD AUTO: 0.3 % (ref 0–1.5)
BILIRUB SERPL-MCNC: 0.6 MG/DL (ref 0–1.2)
BILIRUB UR QL STRIP: NEGATIVE
BUN SERPL-MCNC: 12 MG/DL (ref 8–23)
BUN/CREAT SERPL: 17.1 (ref 7–25)
CALCIUM SPEC-SCNC: 9.3 MG/DL (ref 8.6–10.5)
CHLORIDE SERPL-SCNC: 99 MMOL/L (ref 98–107)
CLARITY UR: ABNORMAL
CO2 SERPL-SCNC: 25.5 MMOL/L (ref 22–29)
COLOR UR: YELLOW
CREAT SERPL-MCNC: 0.7 MG/DL (ref 0.57–1)
D-LACTATE SERPL-SCNC: 2 MMOL/L (ref 0.5–2)
DEPRECATED RDW RBC AUTO: 44.5 FL (ref 37–54)
EGFRCR SERPLBLD CKD-EPI 2021: 89.8 ML/MIN/1.73
EOSINOPHIL # BLD AUTO: 0.05 10*3/MM3 (ref 0–0.4)
EOSINOPHIL NFR BLD AUTO: 0.8 % (ref 0.3–6.2)
ERYTHROCYTE [DISTWIDTH] IN BLOOD BY AUTOMATED COUNT: 13.8 % (ref 12.3–15.4)
GLOBULIN UR ELPH-MCNC: 3 GM/DL
GLUCOSE SERPL-MCNC: 284 MG/DL (ref 65–99)
GLUCOSE UR STRIP-MCNC: ABNORMAL MG/DL
HCT VFR BLD AUTO: 44 % (ref 34–46.6)
HGB BLD-MCNC: 14.5 G/DL (ref 12–15.9)
HGB UR QL STRIP.AUTO: NEGATIVE
HOLD SPECIMEN: NORMAL
HOLD SPECIMEN: NORMAL
IMM GRANULOCYTES # BLD AUTO: 0.02 10*3/MM3 (ref 0–0.05)
IMM GRANULOCYTES NFR BLD AUTO: 0.3 % (ref 0–0.5)
KETONES UR QL STRIP: ABNORMAL
LEUKOCYTE ESTERASE UR QL STRIP.AUTO: NEGATIVE
LIPASE SERPL-CCNC: 67 U/L (ref 13–60)
LYMPHOCYTES # BLD AUTO: 1.35 10*3/MM3 (ref 0.7–3.1)
LYMPHOCYTES NFR BLD AUTO: 20.7 % (ref 19.6–45.3)
MCH RBC QN AUTO: 29.3 PG (ref 26.6–33)
MCHC RBC AUTO-ENTMCNC: 33 G/DL (ref 31.5–35.7)
MCV RBC AUTO: 88.9 FL (ref 79–97)
MONOCYTES # BLD AUTO: 0.37 10*3/MM3 (ref 0.1–0.9)
MONOCYTES NFR BLD AUTO: 5.7 % (ref 5–12)
NEUTROPHILS NFR BLD AUTO: 4.7 10*3/MM3 (ref 1.7–7)
NEUTROPHILS NFR BLD AUTO: 72.2 % (ref 42.7–76)
NITRITE UR QL STRIP: NEGATIVE
NRBC BLD AUTO-RTO: 0 /100 WBC (ref 0–0.2)
PH UR STRIP.AUTO: 7.5 [PH] (ref 5–8)
PLATELET # BLD AUTO: 224 10*3/MM3 (ref 140–450)
PMV BLD AUTO: 10.6 FL (ref 6–12)
POTASSIUM SERPL-SCNC: 4.1 MMOL/L (ref 3.5–5.2)
PROT SERPL-MCNC: 7 G/DL (ref 6–8.5)
PROT UR QL STRIP: NEGATIVE
QT INTERVAL: 441 MS
QTC INTERVAL: 434 MS
RBC # BLD AUTO: 4.95 10*6/MM3 (ref 3.77–5.28)
SODIUM SERPL-SCNC: 135 MMOL/L (ref 136–145)
SP GR UR STRIP: >=1.03 (ref 1–1.03)
UROBILINOGEN UR QL STRIP: ABNORMAL
WBC NRBC COR # BLD AUTO: 6.51 10*3/MM3 (ref 3.4–10.8)
WHOLE BLOOD HOLD COAG: NORMAL
WHOLE BLOOD HOLD SPECIMEN: NORMAL

## 2023-12-25 PROCEDURE — 80053 COMPREHEN METABOLIC PANEL: CPT | Performed by: EMERGENCY MEDICINE

## 2023-12-25 PROCEDURE — 74177 CT ABD & PELVIS W/CONTRAST: CPT

## 2023-12-25 PROCEDURE — 25010000002 HYDROMORPHONE 1 MG/ML SOLUTION: Performed by: EMERGENCY MEDICINE

## 2023-12-25 PROCEDURE — 81003 URINALYSIS AUTO W/O SCOPE: CPT | Performed by: EMERGENCY MEDICINE

## 2023-12-25 PROCEDURE — 96376 TX/PRO/DX INJ SAME DRUG ADON: CPT

## 2023-12-25 PROCEDURE — 25810000003 SODIUM CHLORIDE 0.9 % SOLUTION: Performed by: EMERGENCY MEDICINE

## 2023-12-25 PROCEDURE — 93005 ELECTROCARDIOGRAM TRACING: CPT | Performed by: EMERGENCY MEDICINE

## 2023-12-25 PROCEDURE — 25010000002 ONDANSETRON PER 1 MG: Performed by: EMERGENCY MEDICINE

## 2023-12-25 PROCEDURE — 96374 THER/PROPH/DIAG INJ IV PUSH: CPT

## 2023-12-25 PROCEDURE — 25510000001 IOPAMIDOL PER 1 ML: Performed by: EMERGENCY MEDICINE

## 2023-12-25 PROCEDURE — 99285 EMERGENCY DEPT VISIT HI MDM: CPT

## 2023-12-25 PROCEDURE — 96375 TX/PRO/DX INJ NEW DRUG ADDON: CPT

## 2023-12-25 PROCEDURE — 83690 ASSAY OF LIPASE: CPT | Performed by: EMERGENCY MEDICINE

## 2023-12-25 PROCEDURE — 85025 COMPLETE CBC W/AUTO DIFF WBC: CPT | Performed by: EMERGENCY MEDICINE

## 2023-12-25 PROCEDURE — 83605 ASSAY OF LACTIC ACID: CPT | Performed by: EMERGENCY MEDICINE

## 2023-12-25 RX ORDER — ONDANSETRON 2 MG/ML
4 INJECTION INTRAMUSCULAR; INTRAVENOUS ONCE
Status: COMPLETED | OUTPATIENT
Start: 2023-12-25 | End: 2023-12-25

## 2023-12-25 RX ORDER — SODIUM CHLORIDE 0.9 % (FLUSH) 0.9 %
10 SYRINGE (ML) INJECTION AS NEEDED
Status: DISCONTINUED | OUTPATIENT
Start: 2023-12-25 | End: 2023-12-25 | Stop reason: HOSPADM

## 2023-12-25 RX ORDER — OXYCODONE HYDROCHLORIDE AND ACETAMINOPHEN 5; 325 MG/1; MG/1
1 TABLET ORAL EVERY 8 HOURS PRN
Qty: 15 TABLET | Refills: 0 | Status: SHIPPED | OUTPATIENT
Start: 2023-12-25

## 2023-12-25 RX ORDER — ONDANSETRON 4 MG/1
4 TABLET, ORALLY DISINTEGRATING ORAL EVERY 8 HOURS PRN
Qty: 12 TABLET | Refills: 0 | Status: SHIPPED | OUTPATIENT
Start: 2023-12-25

## 2023-12-25 RX ADMIN — ONDANSETRON 4 MG: 2 INJECTION INTRAMUSCULAR; INTRAVENOUS at 11:41

## 2023-12-25 RX ADMIN — HYDROMORPHONE HYDROCHLORIDE 0.5 MG: 1 INJECTION, SOLUTION INTRAMUSCULAR; INTRAVENOUS; SUBCUTANEOUS at 11:41

## 2023-12-25 RX ADMIN — HYDROMORPHONE HYDROCHLORIDE 1 MG: 1 INJECTION, SOLUTION INTRAMUSCULAR; INTRAVENOUS; SUBCUTANEOUS at 09:43

## 2023-12-25 RX ADMIN — ONDANSETRON 4 MG: 2 INJECTION INTRAMUSCULAR; INTRAVENOUS at 09:43

## 2023-12-25 RX ADMIN — IOPAMIDOL 80 ML: 755 INJECTION, SOLUTION INTRAVENOUS at 10:12

## 2023-12-25 RX ADMIN — SODIUM CHLORIDE 1000 ML: 9 INJECTION, SOLUTION INTRAVENOUS at 09:43

## 2023-12-25 NOTE — SIGNIFICANT NOTE
12/25/23 1236   Plan   Plan Comments SW met with patient and family at bedside to discuss getting established with a new PCP. Pt reports that her current PCP no longer takes her insurance and she is needing to get established with a new PCP. She has contacted her insurance and they were able to provide her with an option of a doctor's office but forgot the name of it. SW informed that she will have the  here tomorrow follow up with her to get the name of the office to call and get her scheduled due to the urgency of patient's cancer diagnosis per provider's request. Pt was agreeable with this plan. BRIAN also gave the direct number to the 's desk to call if she hasn't heard from anyone before 12 pm tomorrow.

## 2023-12-25 NOTE — ED NOTES
LUQ sharp pain since 2200 last night, no urine issues, did have a BM last night, denies vomiting, has some nausea, pain 9/10, pain intensifies with palpation.

## 2023-12-25 NOTE — ED PROVIDER NOTES
Time: 9:13 AM EST  Date of encounter:  12/25/2023  Independent Historian/Clinical History and Information was obtained by:   Patient    History is limited by: N/A    Chief Complaint: Left flank and abdominal pain      History of Present Illness:  Patient is a 78 y.o. year old female who presents to the emergency department for evaluation of left flank and abdominal pain.  The patient states that the symptoms began last night.  She has had nausea but no vomiting she does complain of loose stools and some diarrhea.  She has had no fever chills cough sore throat or other upper respiratory infection symptoms.    HPI    Patient Care Team  Primary Care Provider: Afia Herron APRN    Past Medical History:     Allergies   Allergen Reactions    Codeine Palpitations     Past Medical History:   Diagnosis Date    Allergies     Anemia 1982    Arthritis     Cataracts, bilateral     Diabetes     Esophageal reflux disease     Forgetfulness     Gall stones 01/1985    Head injury 1964    Hemorrhoids 1967    HLD (hyperlipidemia)     Seizures     AS A CHILD    Sinus trouble     SOB (shortness of breath)      Past Surgical History:   Procedure Laterality Date    APPENDECTOMY  04/1966    BREAST BIOPSY Right 08/2013    CATARACT EXTRACTION  05/2018    COLONOSCOPY      10 YEARS AGO    ENDOSCOPY  2019    DONE IN [place]    GALLBLADDER SURGERY  01/1985    HYSTERECTOMY  06/1984    TOENAIL EXCISION Left 6/1965, 2008, 2016    INGROWN TOENAIL; BIG TOENAIL    TONSILLECTOMY  05/1965     Family History   Problem Relation Age of Onset    Heart disease Other     Diabetes Other        Home Medications:  Prior to Admission medications    Medication Sig Start Date End Date Taking? Authorizing Provider   B Complex Vitamins (vitamin b complex) capsule capsule Take 1 capsule by mouth Daily.    ProviderGela MD   CINNAMON PO Take 1 capsule by mouth Daily.    ProviderGela MD   coenzyme Q10 100 MG capsule Take 100 mg by mouth Daily.  "   Gela Stewart MD   glimepiride (AMARYL) 1 MG tablet TAKE 1 TABLET BY MOUTH TWICE A DAY 2/23/23   Afia Herron APRN   MAGNESIUM OXIDE PO Take 1 capsule by mouth Daily.    Gela Stewart MD   pravastatin (PRAVACHOL) 20 MG tablet Take 1 tablet by mouth Every Night for 90 days. 8/10/21 11/8/21  Afia Herron APRN   Turmeric 500 MG tablet Take 1 tablet by mouth Daily.    Gela Stewart MD   vitamin C (ASCORBIC ACID) 500 MG tablet Take 500 mg by mouth 2 (Two) Times a Day.    Gela Stewart MD   vitamin D3 125 MCG (5000 UT) capsule capsule Take 1 capsule by mouth 2 (Two) Times a Day.    Gela Stewart MD   vitamin E 400 UNIT capsule Take 400 Units by mouth Daily.    Gela Stewart MD   Zinc 50 MG capsule Take 1 each by mouth 2 (Two) Times a Day.    Gela Stewart MD        Social History:   Social History     Tobacco Use    Smoking status: Never    Smokeless tobacco: Never         Review of Systems:  Review of Systems   Constitutional:  Negative for chills and fever.   HENT:  Negative for congestion, ear pain and sore throat.    Eyes:  Negative for pain.   Respiratory:  Negative for cough, chest tightness and shortness of breath.    Cardiovascular:  Negative for chest pain.   Gastrointestinal:  Positive for abdominal pain, diarrhea and nausea. Negative for vomiting.   Genitourinary:  Positive for flank pain. Negative for hematuria.   Musculoskeletal:  Negative for joint swelling.   Skin:  Negative for pallor.   Neurological:  Negative for seizures and headaches.   All other systems reviewed and are negative.       Physical Exam:  /66   Pulse 55   Temp 97.5 °F (36.4 °C) (Oral)   Resp 18   Ht 160 cm (63\")   Wt 93.7 kg (206 lb 9.1 oz)   SpO2 95%   BMI 36.59 kg/m²     Physical Exam  Vitals and nursing note reviewed.   Constitutional:       General: She is not in acute distress.     Appearance: Normal appearance. She is not toxic-appearing. "   HENT:      Head: Normocephalic and atraumatic.      Mouth/Throat:      Mouth: Mucous membranes are moist.   Eyes:      General: No scleral icterus.  Cardiovascular:      Rate and Rhythm: Normal rate and regular rhythm.      Pulses: Normal pulses.      Heart sounds: Normal heart sounds.   Pulmonary:      Effort: Pulmonary effort is normal. No respiratory distress.      Breath sounds: Normal breath sounds.   Abdominal:      General: Abdomen is flat.      Palpations: Abdomen is soft.      Tenderness: There is abdominal tenderness.      Comments: Left lower quadrant tenderness.   Musculoskeletal:         General: Normal range of motion.      Cervical back: Normal range of motion and neck supple.   Skin:     General: Skin is warm and dry.      Capillary Refill: Capillary refill takes less than 2 seconds.   Neurological:      General: No focal deficit present.      Mental Status: She is alert and oriented to person, place, and time. Mental status is at baseline.   Psychiatric:         Mood and Affect: Mood normal.         Behavior: Behavior normal.         Thought Content: Thought content normal.         Judgment: Judgment normal.                  Procedures:  Procedures      Medical Decision Making:      Comorbidities that affect care:    Prior appendectomy and cholecystectomy.    External Notes reviewed:    Previous Clinic Note: Primary care visit for diabetes.      The following orders were placed and all results were independently analyzed by me:  Orders Placed This Encounter   Procedures    CT Abdomen Pelvis With Contrast    [place] Draw    Comprehensive Metabolic Panel    Lipase    Urinalysis With Microscopic If Indicated (No Culture) - Urine, Clean Catch    Lactic Acid, Plasma    CBC Auto Differential    Undress & Gown    ECG 12 Lead Chest Pain    CBC & Differential    Green Top (Gel)    Lavender Top    Gold Top - SST    Light Blue Top       Medications Given in the Emergency Department:  Medications   sodium  chloride 0.9 % bolus 1,000 mL (0 mL Intravenous Stopped 12/25/23 1232)   HYDROmorphone (DILAUDID) injection 1 mg (1 mg Intravenous Given 12/25/23 0943)   ondansetron (ZOFRAN) injection 4 mg (4 mg Intravenous Given 12/25/23 0943)   iopamidol (ISOVUE-370) 76 % injection 100 mL (80 mL Intravenous Given 12/25/23 1012)   HYDROmorphone (DILAUDID) injection 0.5 mg (0.5 mg Intravenous Given 12/25/23 1141)   ondansetron (ZOFRAN) injection 4 mg (4 mg Intravenous Given 12/25/23 1141)        ED Course:         Labs:    Lab Results (last 24 hours)       Procedure Component Value Units Date/Time    CBC & Differential [[id number]]  (Normal) Collected: 12/25/23 0912    Specimen: Blood Updated: 12/25/23 0920    Narrative:      The following orders were created for panel order CBC & Differential.  Procedure                               Abnormality         Status                     ---------                               -----------         ------                     CBC Auto Differential[[id number]]        Normal              Final result                 Please view results for these tests on the individual orders.    Comprehensive Metabolic Panel [[id number]]  (Abnormal) Collected: 12/25/23 0912    Specimen: Blood Updated: 12/25/23 0935     Glucose 284 mg/dL      BUN 12 mg/dL      Creatinine 0.70 mg/dL      Sodium 135 mmol/L      Potassium 4.1 mmol/L      Chloride 99 mmol/L      CO2 25.5 mmol/L      Calcium 9.3 mg/dL      Total Protein 7.0 g/dL      Albumin 4.0 g/dL      ALT (SGPT) 13 U/L      AST (SGOT) 16 U/L      Alkaline Phosphatase 85 U/L      Total Bilirubin 0.6 mg/dL      Globulin 3.0 gm/dL      A/G Ratio 1.3 g/dL      BUN/Creatinine Ratio 17.1     Anion Gap 10.5 mmol/L      eGFR 89.8 mL/min/1.73     Narrative:      GFR Normal >60  Chronic Kidney Disease <60  Kidney Failure <15    The GFR formula is only valid for adults with stable renal function between ages 18 and 70.    Lipase [[id number]]  (Abnormal) Collected: 12/25/23  0912    Specimen: Blood Updated: 12/25/23 0935     Lipase 67 U/L     Lactic Acid, Plasma [[id number]]  (Normal) Collected: 12/25/23 0912    Specimen: Blood Updated: 12/25/23 0942     Lactate 2.0 mmol/L     CBC Auto Differential [[id number]]  (Normal) Collected: 12/25/23 0912    Specimen: Blood Updated: 12/25/23 0920     WBC 6.51 10*3/mm3      RBC 4.95 10*6/mm3      Hemoglobin 14.5 g/dL      Hematocrit 44.0 %      MCV 88.9 fL      MCH 29.3 pg      MCHC 33.0 g/dL      RDW 13.8 %      RDW-SD 44.5 fl      MPV 10.6 fL      Platelets 224 10*3/mm3      Neutrophil % 72.2 %      Lymphocyte % 20.7 %      Monocyte % 5.7 %      Eosinophil % 0.8 %      Basophil % 0.3 %      Immature Grans % 0.3 %      Neutrophils, Absolute 4.70 10*3/mm3      Lymphocytes, Absolute 1.35 10*3/mm3      Monocytes, Absolute 0.37 10*3/mm3      Eosinophils, Absolute 0.05 10*3/mm3      Basophils, Absolute 0.02 10*3/mm3      Immature Grans, Absolute 0.02 10*3/mm3      nRBC 0.0 /100 WBC     Urinalysis With Microscopic If Indicated (No Culture) - Urine, Clean Catch [[id number]]  (Abnormal) Collected: 12/25/23 0947    Specimen: Urine, Clean Catch Updated: 12/25/23 0955     Color, UA Yellow     Appearance, UA Cloudy     pH, UA 7.5     Specific Gravity, UA >=1.030     Glucose,  mg/dL (2+)     Ketones, UA 40 mg/dL (2+)     Bilirubin, UA Negative     Blood, UA Negative     Protein, UA Negative     Leuk Esterase, UA Negative     Nitrite, UA Negative     Urobilinogen, UA 0.2 E.U./dL    Narrative:      Urine microscopic not indicated.             Imaging:    CT Abdomen Pelvis With Contrast    Result Date: 12/25/2023  PROCEDURE: CT ABDOMEN PELVIS W CONTRAST  COMPARISON: None  INDICATIONS: Left flank and LLQ abdominal pain  TECHNIQUE: After obtaining the patient's consent, CT images were created with non-ionic intravenous contrast material.   PROTOCOL:   Standard imaging protocol performed    RADIATION:   DLP: 1207.6 mGy*cm   Automated exposure control was  utilized to minimize radiation dose. CONTRAST: 80 cc LABS:   eGFR: 0.7 ml/min/1.73m2  FINDINGS:  Lung bases are clear bilaterally.  A moderate hiatal hernia is noted.  A cholecystectomy has been performed.  The liver, spleen and adrenal glands appear unremarkable.  In the portal triad region is a 3.8 cm x 2.9 cm mass or adenopathy.  The lesion abuts and potentially could arise from the pancreatic neck.  It contains low-density foci consistent with cystic or necrotic components.  Arising from the anterior aspect of the pancreatic tail is a cystic mass measuring 1.5 cm.  It is suspected to contain thin internal septations.   In the inferior left kidney is a 0.4 cm low density focus suspected to represent a small angiomyolipoma.  Both kidneys otherwise appear unremarkable.  No ureteral stone is identified on either side.  The urinary bladder appears normal.  A 1.0 cm lymph node is noted in the gastrohepatic ligament.  Retroperitoneal adenopathy is noted measuring up to 1.3 cm in short axis.  No pelvic adenopathy is seen.  No free fluid is evident.  The urinary bladder appears normal.  A hysterectomy has been performed.  The adnexal regions appear unremarkable.  No acute bowel abnormality is seen.  The lack of oral contrast limits evaluation of the bowel.  The appendix is not visualized consistent with reported history of previous appendectomy.  No focal osseous abnormality is seen.  There is a 0.4 cm anterolisthesis of L4 on L5.  Severe degenerative disc changes are noted throughout the lumbar spine.        1. 3.8 cm x 2.9 cm masslike density in the portal triad region could represent a primary pancreatic neoplasm or partially necrotic adenopathy.  Consider abdomen MRI to further evaluate. 2. 1.5 cm cystic focus arising from the pancreatic tail.  This could represent mildly complex cyst or cystic neoplasm. 3. Retroperitoneal adenopathy, as above.  Metastatic disease is suspected.  4. Moderate hiatal hernia 5. Previous  cholecystectomy, appendectomy and hysterectomy.     Otto Nguyen M.D.       Electronically Signed and Approved By: Otto Nguyen M.D. on 12/25/2023 at 10:51                Differential Diagnosis and Discussion:    Abdominal Pain: Based on the patient's signs and symptoms, I considered abdominal aortic aneurysm, small bowel obstruction, pancreatitis, acute cholecystitis, acute appendecitis, peptic ulcer disease, gastritis, colitis, endocrine disorders, irritable bowel syndrome and other differential diagnosis an etiology of the patient's abdominal pain.    All labs were reviewed and interpreted by me.    MDM  Number of Diagnoses or Management Options  Abdominal pain, unspecified abdominal location  Pancreatic mass  Diagnosis management comments: I had a lengthy discussion with this patient regarding her CT findings.  I then spoke with Dr. Collier who is on-call for gastroenterology.  He indicated the patient needs an ultrasound-guided endoscopic biopsy of this area.  He suggested that I call oncology.  I then spoke with Dr. Posada who is on for oncology and she indicated that they would not see the patient until she had biopsy-proven cancer.  I then spoke with the patient and she indicated that her insurance was dictating that she may need to change providers within the next 4 to 5 days.  So I am having  discussed this with the patient in hopes of procuring another primary care provider.  I did inform the patient of the importance of having this performed promptly so that a biopsy could be done and a diagnosis established with that she can then see oncology if necessary.  I instructed her to return to the emergency department symptoms return persist.       Amount and/or Complexity of Data Reviewed  Clinical lab tests: reviewed  Tests in the radiology section of CPT®: reviewed  Tests in the medicine section of CPT®: reviewed                 Patient Care Considerations:    CHEST X-RAY: I considered  ordering a chest x-ray however patient has no pulmonary symptoms.      Consultants/Shared Management Plan:    Consultant: I have discussed the case with gastroenterology, oncology and  who states we will refer the patient back to primary care so that she can be referred for an outpatient endoscopic ultrasound biopsy of her pancreas.    Social Determinants of Health:    Patient has presented with family members who are responsible, reliable and will ensure follow up care.      Disposition and Care Coordination:    Discharged: I considered escalation of care by admitting this patient to the hospital, however the patient has improved and is suitable and stable for discharge.    The patient is pain-free and does not want to be admitted to the hospital.    Final diagnoses:   Pancreatic mass   Abdominal pain, unspecified abdominal location        ED Disposition       ED Disposition   Discharge    Condition   Stable    Comment   --               This medical record created using voice recognition software.             Carson Dumont,   12/25/23 1803

## 2023-12-25 NOTE — ED NOTES
Pt complaining of mid epigastric chest pain 3/10, non radiating, feels pressure. States has hx of GERD issues believes this to be similar.. Pt placed on EKG monitoring, EKG ordered, Provider informed.

## 2023-12-25 NOTE — DISCHARGE INSTRUCTIONS
Drink plenty of fluids.  Take medications as directed.  You will need to follow-up with primary care provider so that you can have the appropriate biopsy performed on your pancreas.  At that point you can be referred to oncology if necessary..  If symptoms return or persist or you cannot get into see a physician you need to come back to the emergency department.

## 2023-12-26 ENCOUNTER — TELEPHONE (OUTPATIENT)
Dept: FAMILY MEDICINE CLINIC | Facility: CLINIC | Age: 78
End: 2023-12-26
Payer: MEDICARE

## 2023-12-26 NOTE — SIGNIFICANT NOTE
12/26/23 0852   Plan   Final Note SW contacted pt this morning to get additional information regarding PCP contacts. Pt provided Yazmin Hughes and Lizabeth Montalvo as providers that her insurance will accepts. SW contacted [facility] this date and they were able to get pt an appt today, 12/26/23, at 330pm. SW contacted pt and provided location, date and time for appt this date.

## 2023-12-26 NOTE — TELEPHONE ENCOUNTER
Left patient a message at [phone] to please return our call to discuss next steps following her ER visazam

## 2024-01-02 LAB
QT INTERVAL: 441 MS
QTC INTERVAL: 434 MS

## 2024-01-04 ENCOUNTER — TELEPHONE (OUTPATIENT)
Dept: FAMILY MEDICINE CLINIC | Facility: CLINIC | Age: 79
End: 2024-01-04
Payer: MEDICARE

## 2024-01-04 NOTE — TELEPHONE ENCOUNTER
Patient just wanted to let Afia Herron Team know that she is switching insurance to [place] [employer] [facility] and is going to come back to [facility].

## 2024-01-04 NOTE — TELEPHONE ENCOUNTER
I spoke with the patients son who stated they were aware of the mass seen on CT and they were aware [facility] no longer takes his mothers insurance but I let him know we can still place referrals for her to get a biopsy and see a specialist with [facility] who [facility] take her insurance, he stated he would speak with his mother and have her give us a  call asap.

## 2024-02-02 ENCOUNTER — OFFICE VISIT (OUTPATIENT)
Dept: FAMILY MEDICINE CLINIC | Facility: CLINIC | Age: 79
End: 2024-02-02
Payer: MEDICARE

## 2024-02-02 VITALS
HEART RATE: 75 BPM | DIASTOLIC BLOOD PRESSURE: 84 MMHG | SYSTOLIC BLOOD PRESSURE: 148 MMHG | TEMPERATURE: 97.7 F | BODY MASS INDEX: 35.03 KG/M2 | OXYGEN SATURATION: 98 % | HEIGHT: 63 IN | WEIGHT: 197.7 LBS

## 2024-02-02 DIAGNOSIS — R16.0 LIVER MASS: ICD-10-CM

## 2024-02-02 DIAGNOSIS — R10.84 GENERALIZED ABDOMINAL PAIN: ICD-10-CM

## 2024-02-02 DIAGNOSIS — K86.89 PANCREATIC MASS: Primary | ICD-10-CM

## 2024-02-02 PROCEDURE — 99214 OFFICE O/P EST MOD 30 MIN: CPT | Performed by: NURSE PRACTITIONER

## 2024-02-02 PROCEDURE — 1159F MED LIST DOCD IN RCRD: CPT | Performed by: NURSE PRACTITIONER

## 2024-02-02 PROCEDURE — 1160F RVW MEDS BY RX/DR IN RCRD: CPT | Performed by: NURSE PRACTITIONER

## 2024-02-02 RX ORDER — PHYTONADIONE 5 MG/1
5 TABLET ORAL ONCE
COMMUNITY

## 2024-02-02 NOTE — PROGRESS NOTES
Chief Complaint  Mass (Review MRI )    Subjective        Medical History: has a past medical history of Allergies, Anemia (1982), Arthritis, Cataracts, bilateral, Diabetes, Esophageal reflux disease, Forgetfulness, Gall stones (01/1985), Head injury (1964), Hemorrhoids (1967), HLD (hyperlipidemia), Seizures, Sinus trouble, and SOB (shortness of breath).     Surgical History: has a past surgical history that includes Appendectomy (04/1966); Breast biopsy (Right, 08/2013); Cataract extraction (05/2018); Colonoscopy; Esophagogastroduodenoscopy (2019); TOENAIL EXCISION (Left, 6/1965, 2008, 2016); Gallbladder surgery (01/1985); Hysterectomy (06/1984); Tonsillectomy (05/1965); Cardiac catheterization (09/27/2012); and Cataract extraction (05/15/2018).     Family History: family history includes Diabetes in an other family member; Heart disease in an other family member.     Social History: reports that she has never smoked. She has never used smokeless tobacco. She reports that she does not drink alcohol and does not use drugs.    Edna Alejandra presents to [facility]  History of Present Illness  Patient comes into the office today to discuss recent MRI.  Christmas 2023 patient went to the emergency room for abdominal pain.  It was shown that she had a pancreatic mass at that time they wanted to admit patient but she declined and went home, notes were sent to the office I did try to get a hold of patient for [employer] to the first of the year.  Patient states that her cell phone services spotty where she lives.  We were able to finally get over the patient's son who spoke with his mom had her to contact office.  At this time patient had insurance that Was no longer excepted that she was starting the process of having to change insurance so she seen an outside provider from [facility] who ordered the MRI of her abdomen.  Patient now has insurance that is covered by back.,  She comes back into  "the office to discuss her MRI.  MRI was done January 10, it does show Solid and cystic necrotic mass seen superior and inseparable from the pancreatic head is consistent with malignancy.  Send considerations include malignant IPMN, neuroendocrine tumor and adenocarcinoma.  Recommend sampling.  Liver masses largest at the superior content.  Concern is liver metastasis.  Multiple pancreatic cystic masses without worrisome features or high risk stigmata.  Suspect sidebranch intraductal papillary mucosal neoplasm is largest in the pancreatic body measuring 2.2 cm.  Retroperitoneal and retrocrural lymph no adenopathy consistent with nodular metastatic disease and large hiatal hernia.  Objective   Vital Signs:   /84   Pulse 75   Temp 97.7 °F (36.5 °C)   Ht 160 cm (63\")   Wt 89.7 kg (197 lb 11.2 oz)   SpO2 98%   BMI 35.02 kg/m²       Wt Readings from Last 3 Encounters:   02/02/24 89.7 kg (197 lb 11.2 oz)   12/25/23 93.7 kg (206 lb 9.1 oz)   08/24/22 91.1 kg (200 lb 12.8 oz)        BP Readings from Last 3 Encounters:   02/02/24 148/84   12/25/23 132/66   08/24/22 152/92        Class 2 Severe Obesity (BMI >=35 and <=39.9). Obesity-related health conditions include the following: diabetes mellitus. Obesity is improving with treatment. BMI is is above average; BMI management plan is completed. We discussed low calorie, low carb based diet program, portion control, and increasing exercise.       Physical Exam  Vitals reviewed.   Constitutional:       General: She is not in acute distress.     Appearance: Normal appearance. She is well-developed. She is obese. She is not ill-appearing or toxic-appearing.   HENT:      Head: Normocephalic and atraumatic.   Eyes:      Conjunctiva/sclera: Conjunctivae normal.      Pupils: Pupils are equal, round, and reactive to light.   Cardiovascular:      Rate and Rhythm: Normal rate and regular rhythm.      Heart sounds: No murmur heard.  Pulmonary:      Effort: Pulmonary effort is " normal.      Breath sounds: Normal breath sounds. No rhonchi.   Abdominal:      Tenderness: There is abdominal tenderness.   Musculoskeletal:      Right lower leg: No edema.      Left lower leg: No edema.   Skin:     General: Skin is warm and dry.   Neurological:      Mental Status: She is alert and oriented to person, place, and time.   Psychiatric:         Mood and Affect: Mood and affect normal.         Behavior: Behavior normal.         Thought Content: Thought content normal.         Judgment: Judgment normal.        Result Review :  {The following data was reviewed by LIAM López on 02/02/2024.  Common labs          12/25/2023    09:12   Common Labs   Glucose 284    BUN 12    Creatinine 0.70    Sodium 135    Potassium 4.1    Chloride 99    Calcium 9.3    Albumin 4.0    Total Bilirubin 0.6    Alkaline Phosphatase 85    AST (SGOT) 16    ALT (SGPT) 13    WBC 6.51    Hemoglobin 14.5    Hematocrit 44.0    Platelets 224      Data reviewed : Most recent MRI of abdomen and CT of the abdomen         FINDINGS:          Lung bases are clear bilaterally.  A moderate hiatal hernia is noted.     A cholecystectomy has been performed.  The liver, spleen and adrenal glands appear unremarkable.    In the portal triad region is a 3.8 cm x 2.9 cm mass or adenopathy.  The lesion abuts and   potentially could arise from the pancreatic neck.  It contains low-density foci consistent with   cystic or necrotic components.  Arising from the anterior aspect of the pancreatic tail is a cystic   mass measuring 1.5 cm.  It is suspected to contain thin internal septations.       In the inferior left kidney is a 0.4 cm low density focus suspected to represent a small   angiomyolipoma.  Both kidneys otherwise appear unremarkable.  No ureteral stone is identified on   either side.  The urinary bladder appears normal.     A 1.0 cm lymph node is noted in the gastrohepatic ligament.  Retroperitoneal adenopathy is noted   measuring  up to 1.3 cm in short axis.  No pelvic adenopathy is seen.  No free fluid is evident.     The urinary bladder appears normal.  A hysterectomy has been performed.  The adnexal regions appear   unremarkable.     No acute bowel abnormality is seen.  The lack of oral contrast limits evaluation of the bowel.  The   appendix is not visualized consistent with reported history of previous appendectomy.     No focal osseous abnormality is seen.  There is a 0.4 cm anterolisthesis of L4 on L5.  Severe   degenerative disc changes are noted throughout the lumbar spine.     IMPRESSION:                 1. 3.8 cm x 2.9 cm masslike density in the portal triad region could represent a primary pancreatic   neoplasm or partially necrotic adenopathy.  Consider abdomen MRI to further evaluate.  2. 1.5 cm cystic focus arising from the pancreatic tail.  This could represent mildly complex cyst   or cystic neoplasm.  3. Retroperitoneal adenopathy, as above.  Metastatic disease is suspected.    4. Moderate hiatal hernia  5. Previous cholecystectomy, appendectomy and hysterectomy.       Current Outpatient Medications on File Prior to Visit   Medication Sig Dispense Refill    CINNAMON PO Take  by mouth.      glimepiride (AMARYL) 1 MG tablet TAKE 1 TABLET BY MOUTH TWICE A  tablet 1    MAGNESIUM OXIDE PO Take 1 capsule by mouth Daily.      phytonadione (MEPHYTON, VITAMIN K) 5 MG tablet Take 1 tablet by mouth 1 (One) Time.      vitamin C (ASCORBIC ACID) 500 MG tablet Take 1 tablet by mouth 2 (Two) Times a Day.      vitamin D3 125 MCG (5000 UT) capsule capsule Take 1 capsule by mouth 2 (Two) Times a Day.      vitamin E 400 UNIT capsule Take 1 capsule by mouth Daily.      Zinc 50 MG capsule Take 1 each by mouth 2 (Two) Times a Day.      coenzyme Q10 100 MG capsule Take 100 mg by mouth Daily.      pravastatin (PRAVACHOL) 20 MG tablet Take 1 tablet by mouth Every Night for 90 days. 90 tablet 1     No current facility-administered medications  on file prior to visit.        Assessment and Plan  Diagnoses and all orders for this visit:    1. Pancreatic mass (Primary)  -     Ambulatory Referral to Gastroenterology    2. Generalized abdominal pain  -     Ambulatory Referral to Gastroenterology    3. Liver mass  -     Ambulatory Referral to Gastroenterology    Discussed with patient and plan of care will place a emergent/urgent referral to pancreatic specialist gastroenterologist out of [facility].  Discussed with patient she may need biopsy, and further intervention/recommendation from specialty to determine best treatment option.  Patient is a very active, well-appearing 78-year-old who takes care of her grandson, lives with her son and daughter-in-law, assist daily with chores including mowing the yard.  Will follow-up closely.  Instructed patient to call office with any questions or concerns.  Patient verbalized understanding.    Follow Up   Return in about 4 weeks (around 3/1/2024) for If symptoms do not improve new concerning symptoms, Recheck.  Patient was given instructions and counseling regarding her condition or for health maintenance advice. Please see specific information pulled into the AVS if appropriate.       Part of this note may be electronic transcription/translation of spoken language to printed text using the Dragon dictation system

## 2024-02-26 ENCOUNTER — OFFICE VISIT (OUTPATIENT)
Dept: FAMILY MEDICINE CLINIC | Facility: CLINIC | Age: 79
End: 2024-02-26
Payer: MEDICARE

## 2024-02-26 ENCOUNTER — TELEPHONE (OUTPATIENT)
Dept: FAMILY MEDICINE CLINIC | Facility: CLINIC | Age: 79
End: 2024-02-26

## 2024-02-26 VITALS
HEART RATE: 72 BPM | SYSTOLIC BLOOD PRESSURE: 134 MMHG | TEMPERATURE: 97.2 F | BODY MASS INDEX: 34.59 KG/M2 | HEIGHT: 63 IN | WEIGHT: 195.2 LBS | DIASTOLIC BLOOD PRESSURE: 78 MMHG | OXYGEN SATURATION: 98 %

## 2024-02-26 DIAGNOSIS — E55.9 VITAMIN D DEFICIENCY: ICD-10-CM

## 2024-02-26 DIAGNOSIS — E78.2 MIXED HYPERLIPIDEMIA: ICD-10-CM

## 2024-02-26 DIAGNOSIS — Z09 FOLLOW-UP EXAM: Primary | ICD-10-CM

## 2024-02-26 DIAGNOSIS — K86.89 PANCREATIC MASS: ICD-10-CM

## 2024-02-26 DIAGNOSIS — E11.9 TYPE 2 DIABETES MELLITUS WITHOUT COMPLICATION, WITHOUT LONG-TERM CURRENT USE OF INSULIN: ICD-10-CM

## 2024-02-26 LAB
25(OH)D3 SERPL-MCNC: 74.2 NG/ML (ref 30–100)
ALBUMIN UR-MCNC: <1.2 MG/DL
CHOLEST SERPL-MCNC: 210 MG/DL (ref 0–200)
HBA1C MFR BLD: 8 % (ref 4.8–5.6)
HDLC SERPL-MCNC: 57 MG/DL (ref 40–60)
LDLC SERPL CALC-MCNC: 134 MG/DL (ref 0–100)
LDLC/HDLC SERPL: 2.3 {RATIO}
TRIGL SERPL-MCNC: 109 MG/DL (ref 0–150)
VLDLC SERPL-MCNC: 19 MG/DL (ref 5–40)

## 2024-02-26 PROCEDURE — 83036 HEMOGLOBIN GLYCOSYLATED A1C: CPT | Performed by: NURSE PRACTITIONER

## 2024-02-26 PROCEDURE — 82306 VITAMIN D 25 HYDROXY: CPT | Performed by: NURSE PRACTITIONER

## 2024-02-26 PROCEDURE — 80061 LIPID PANEL: CPT | Performed by: NURSE PRACTITIONER

## 2024-02-26 PROCEDURE — 82043 UR ALBUMIN QUANTITATIVE: CPT | Performed by: NURSE PRACTITIONER

## 2024-02-26 NOTE — PROGRESS NOTES
Chief Complaint  Pancreatic mass, Diabetes, and Hyperlipidemia    Subjective        Medical History: has a past medical history of Allergies, Anemia (1982), Arthritis, Cataracts, bilateral, Diabetes, Esophageal reflux disease, Forgetfulness, Gall stones (01/1985), Head injury (1964), Hemorrhoids (1967), HLD (hyperlipidemia), Seizures, Sinus trouble, and SOB (shortness of breath).     Surgical History: has a past surgical history that includes Appendectomy (04/1966); Breast biopsy (Right, 08/2013); Cataract extraction (05/2018); Colonoscopy; Esophagogastroduodenoscopy (2019); TOENAIL EXCISION (Left, 6/1965, 2008, 2016); Gallbladder surgery (01/1985); Hysterectomy (06/1984); Tonsillectomy (05/1965); Cardiac catheterization (09/27/2012); Cataract extraction (05/15/2018); Colonoscopy (10/10/2019); Mammo diagnostic digital tomosynthesis unilateral (01/29/2020); and Arteriogram with Ultrasound Aorto Iliac Femoral Renal Arteries (Bilateral, 01/29/2020).     Family History: family history includes Diabetes in an other family member; Heart disease in an other family member.     Social History: reports that she has never smoked. She has never used smokeless tobacco. She reports that she does not drink alcohol and does not use drugs.    Edna Alejandra presents to [facility]  History of Present Illness  Diabetes  She presents for her follow-up diabetic visit. She has type 2 diabetes mellitus. Her disease course has been fluctuating. There are no hypoglycemic associated symptoms. Pertinent negatives for diabetes include no blurred vision, no fatigue, no polydipsia, no polyphagia and no polyuria. There are no hypoglycemic complications. Symptoms are improving. There are no diabetic complications. Risk factors for coronary artery disease include diabetes mellitus, dyslipidemia, obesity and post-menopausal. Current diabetic treatment includes oral agent (monotherapy). She is compliant with treatment  "most of the time. Her weight is decreasing steadily. She is following a diabetic and generally healthy diet. Meal planning includes carbohydrate counting, avoidance of concentrated sweets, calorie counting and ADA exchanges. She has not had a previous visit with a dietitian. She participates in exercise three times a week. Her home blood glucose trend is decreasing steadily. Her overall blood glucose range is 110-130 mg/dl. An ACE inhibitor/angiotensin II receptor blocker is not being taken. She does not see a podiatrist.Eye exam is not current.   Hyperlipidemia  This is a chronic problem. The current episode started more than 1 year ago. Recent lipid tests were reviewed and are variable. Exacerbating diseases include diabetes and obesity. Current antihyperlipidemic treatment includes statins. The current treatment provides no improvement of lipids. Compliance problems include medication side effects (will not take cholesterol medication).  Risk factors for coronary artery disease include diabetes mellitus, dyslipidemia, obesity and post-menopausal.   Pancreatic Mass  Patient is seeing a gastroenterologist specializing pancreatic mass out of [facility].  She states she was last seen in the office that was by a nurse practitioner, they want to do a biopsy of the mass.  Patient states her supposed to call her today and let her know when the biopsy will be for the pancreatic mass.  Patient states overall she is feeling well, she is not currently have any abdominal pain, nausea or vomiting.  Patient states that she does not actively feel bad so it is difficult to think she might have cancer.  Objective   Vital Signs:   /78   Pulse 72   Temp 97.2 °F (36.2 °C)   Ht 160 cm (63\")   Wt 88.5 kg (195 lb 3.2 oz)   SpO2 98%   BMI 34.58 kg/m²       Wt Readings from Last 3 Encounters:   02/26/24 88.5 kg (195 lb 3.2 oz)   02/02/24 89.7 kg (197 lb 11.2 oz)   12/25/23 93.7 kg (206 lb 9.1 oz)        BP " Readings from Last 3 Encounters:   02/26/24 134/78   02/02/24 148/84   12/25/23 132/66           Physical Exam  Vitals reviewed.   Constitutional:       General: She is not in acute distress.     Appearance: Normal appearance. She is well-developed. She is obese. She is not ill-appearing.   HENT:      Head: Normocephalic and atraumatic.   Eyes:      Conjunctiva/sclera: Conjunctivae normal.      Pupils: Pupils are equal, round, and reactive to light.   Cardiovascular:      Rate and Rhythm: Normal rate and regular rhythm.      Heart sounds: No murmur heard.  Pulmonary:      Effort: Pulmonary effort is normal.      Breath sounds: Normal breath sounds. No wheezing or rhonchi.   Abdominal:      Palpations: Abdomen is soft.   Skin:     General: Skin is warm and dry.   Neurological:      Mental Status: She is alert and oriented to person, place, and time.   Psychiatric:         Mood and Affect: Mood and affect normal.         Behavior: Behavior normal.         Thought Content: Thought content normal.         Judgment: Judgment normal.        Result Review :  {The following data was reviewed by LIAM López on 02/26/2024.  Common labs          12/25/2023    09:12   Common Labs   Glucose 284    BUN 12    Creatinine 0.70    Sodium 135    Potassium 4.1    Chloride 99    Calcium 9.3    Albumin 4.0    Total Bilirubin 0.6    Alkaline Phosphatase 85    AST (SGOT) 16    ALT (SGPT) 13    WBC 6.51    Hemoglobin 14.5    Hematocrit 44.0    Platelets 224      Data reviewed : Previous office note             Current Outpatient Medications on File Prior to Visit   Medication Sig Dispense Refill    CINNAMON PO Take  by mouth.      coenzyme Q10 100 MG capsule Take 1 capsule by mouth Daily.      glimepiride (AMARYL) 1 MG tablet TAKE 1 TABLET BY MOUTH TWICE A  tablet 1    MAGNESIUM OXIDE PO Take 1 capsule by mouth Daily.      phytonadione (MEPHYTON, VITAMIN K) 5 MG tablet Take 1 tablet by mouth 1 (One) Time.      vitamin  C (ASCORBIC ACID) 500 MG tablet Take 1 tablet by mouth 2 (Two) Times a Day.      vitamin D3 125 MCG (5000 UT) capsule capsule Take 1 capsule by mouth 2 (Two) Times a Day.      vitamin E 400 UNIT capsule Take 1 capsule by mouth Daily.      Zinc 50 MG capsule Take 1 each by mouth 2 (Two) Times a Day.      [DISCONTINUED] pravastatin (PRAVACHOL) 20 MG tablet Take 1 tablet by mouth Every Night for 90 days. 90 tablet 1     No current facility-administered medications on file prior to visit.        Assessment and Plan  Diagnoses and all orders for this visit:    1. Follow-up exam (Primary)    2. Type 2 diabetes mellitus without complication, without long-term current use of insulin  -     Hemoglobin A1c  -     Lipid Panel  -     MicroAlbumin, Urine, Random - Urine, Clean Catch    3. Vitamin D deficiency  -     Vitamin D,25-Hydroxy    4. Mixed hyperlipidemia  -     Lipid Panel    5. Pancreatic mass    Will check A1c, vitamin D, and lipid panel.  Patient's had all other labs completed when she was in [facility].  Discussed with patient to call office if she has not heard back about the biopsy appointment for the pancreatic mass.  Discussed to call office with any questions or concerns about her current treatment.  Patient verbalized understanding agreeable treatment plan.    Follow Up   Return in about 3 months (around 5/26/2024) for Recheck.  Patient was given instructions and counseling regarding her condition or for health maintenance advice. Please see specific information pulled into the AVS if appropriate.       Part of this note may be electronic transcription/translation of spoken language to printed text using the Dragon dictation system

## 2024-03-20 ENCOUNTER — TELEPHONE (OUTPATIENT)
Dept: FAMILY MEDICINE CLINIC | Facility: CLINIC | Age: 79
End: 2024-03-20
Payer: MEDICARE

## 2024-03-20 NOTE — TELEPHONE ENCOUNTER
Called Edna Alejandra to inquire on patient's health status (pancreatic mass) however was unable to reach patient.    LVM asking patient to please return call at her earliest convenience.

## 2024-03-20 NOTE — TELEPHONE ENCOUNTER
Hub staff attempted to follow warm transfer process and was unsuccessful     Caller: Edna Alejandra    Relationship to patient: Self    Best call back number: [phone]    Patient is needing: PATIENT HAS APPT IN ABOUT 15 MIN. SHE SHOULD BE HOME BY 4:30 OR 4:00. PLEASE CALL PATIENT BACK AND ADVISE.

## 2024-03-20 NOTE — TELEPHONE ENCOUNTER
Spoke to Edna Alejandra. Confirmed to patient that specialty report was received for PCP review : [facility], [id number] (03/04/2024)     Patient recently seen at Oncology - [facility] and per patient's request patient would like to see oncology in [facility] due to distance for coordination of care. Patient and current oncology provider agreed to patient's request for transfer of care. Patient indicated that oncology provider gave approx life expectancy ranging from days, 6M -1Yr.    Patient mentioned that fasting blood glucose has been fluctuating- 147, 170s,180s, and 213 taken today 03/20/2024. Patient is diet controlled, reports losing 20 lbs and eliminating sugar from diet however is concerned that elevated sugar is due to stress.     Patient will be following up with PCP in office on Monday, 05/20/2024 at 0930.

## 2024-05-20 ENCOUNTER — OFFICE VISIT (OUTPATIENT)
Dept: FAMILY MEDICINE CLINIC | Facility: CLINIC | Age: 79
End: 2024-05-20
Payer: MEDICARE

## 2024-05-20 VITALS
DIASTOLIC BLOOD PRESSURE: 68 MMHG | HEIGHT: 63 IN | HEART RATE: 69 BPM | SYSTOLIC BLOOD PRESSURE: 126 MMHG | OXYGEN SATURATION: 98 % | WEIGHT: 177.7 LBS | TEMPERATURE: 97.8 F | BODY MASS INDEX: 31.48 KG/M2

## 2024-05-20 DIAGNOSIS — C25.9 MALIGNANT NEOPLASM OF PANCREAS, UNSPECIFIED LOCATION OF MALIGNANCY: ICD-10-CM

## 2024-05-20 DIAGNOSIS — E78.2 MIXED HYPERLIPIDEMIA: ICD-10-CM

## 2024-05-20 DIAGNOSIS — E11.9 TYPE 2 DIABETES MELLITUS WITHOUT COMPLICATION, WITHOUT LONG-TERM CURRENT USE OF INSULIN: ICD-10-CM

## 2024-05-20 DIAGNOSIS — Z09 FOLLOW-UP EXAM: Primary | ICD-10-CM

## 2024-05-20 LAB
ALBUMIN SERPL-MCNC: 4.3 G/DL (ref 3.5–5.2)
ALBUMIN/GLOB SERPL: 1.8 G/DL
ALP SERPL-CCNC: 95 U/L (ref 39–117)
ALT SERPL W P-5'-P-CCNC: 21 U/L (ref 1–33)
ANION GAP SERPL CALCULATED.3IONS-SCNC: 9 MMOL/L (ref 5–15)
AST SERPL-CCNC: 26 U/L (ref 1–32)
BASOPHILS # BLD AUTO: 0.03 10*3/MM3 (ref 0–0.2)
BASOPHILS NFR BLD AUTO: 0.5 % (ref 0–1.5)
BILIRUB SERPL-MCNC: 0.6 MG/DL (ref 0–1.2)
BUN SERPL-MCNC: 14 MG/DL (ref 8–23)
BUN/CREAT SERPL: 23 (ref 7–25)
CALCIUM SPEC-SCNC: 9.6 MG/DL (ref 8.6–10.5)
CHLORIDE SERPL-SCNC: 104 MMOL/L (ref 98–107)
CHOLEST SERPL-MCNC: 161 MG/DL (ref 0–200)
CO2 SERPL-SCNC: 27 MMOL/L (ref 22–29)
CREAT SERPL-MCNC: 0.61 MG/DL (ref 0.57–1)
DEPRECATED RDW RBC AUTO: 47.1 FL (ref 37–54)
EGFRCR SERPLBLD CKD-EPI 2021: 92.8 ML/MIN/1.73
EOSINOPHIL # BLD AUTO: 0.08 10*3/MM3 (ref 0–0.4)
EOSINOPHIL NFR BLD AUTO: 1.2 % (ref 0.3–6.2)
ERYTHROCYTE [DISTWIDTH] IN BLOOD BY AUTOMATED COUNT: 14.1 % (ref 12.3–15.4)
GLOBULIN UR ELPH-MCNC: 2.4 GM/DL
GLUCOSE SERPL-MCNC: 143 MG/DL (ref 65–99)
HBA1C MFR BLD: 6.9 % (ref 4.8–5.6)
HCT VFR BLD AUTO: 45.1 % (ref 34–46.6)
HDLC SERPL-MCNC: 54 MG/DL (ref 40–60)
HGB BLD-MCNC: 14.4 G/DL (ref 12–15.9)
IMM GRANULOCYTES # BLD AUTO: 0.03 10*3/MM3 (ref 0–0.05)
IMM GRANULOCYTES NFR BLD AUTO: 0.5 % (ref 0–0.5)
LDLC SERPL CALC-MCNC: 94 MG/DL (ref 0–100)
LDLC/HDLC SERPL: 1.74 {RATIO}
LIPASE SERPL-CCNC: 37 U/L (ref 13–60)
LYMPHOCYTES # BLD AUTO: 1.76 10*3/MM3 (ref 0.7–3.1)
LYMPHOCYTES NFR BLD AUTO: 27.3 % (ref 19.6–45.3)
MCH RBC QN AUTO: 29.6 PG (ref 26.6–33)
MCHC RBC AUTO-ENTMCNC: 31.9 G/DL (ref 31.5–35.7)
MCV RBC AUTO: 92.6 FL (ref 79–97)
MONOCYTES # BLD AUTO: 0.42 10*3/MM3 (ref 0.1–0.9)
MONOCYTES NFR BLD AUTO: 6.5 % (ref 5–12)
NEUTROPHILS NFR BLD AUTO: 4.12 10*3/MM3 (ref 1.7–7)
NEUTROPHILS NFR BLD AUTO: 64 % (ref 42.7–76)
NRBC BLD AUTO-RTO: 0 /100 WBC (ref 0–0.2)
PLATELET # BLD AUTO: 252 10*3/MM3 (ref 140–450)
PMV BLD AUTO: 12.3 FL (ref 6–12)
POTASSIUM SERPL-SCNC: 4.4 MMOL/L (ref 3.5–5.2)
PROT SERPL-MCNC: 6.7 G/DL (ref 6–8.5)
RBC # BLD AUTO: 4.87 10*6/MM3 (ref 3.77–5.28)
SODIUM SERPL-SCNC: 140 MMOL/L (ref 136–145)
TRIGL SERPL-MCNC: 64 MG/DL (ref 0–150)
VLDLC SERPL-MCNC: 13 MG/DL (ref 5–40)
WBC NRBC COR # BLD AUTO: 6.44 10*3/MM3 (ref 3.4–10.8)

## 2024-05-20 PROCEDURE — 1159F MED LIST DOCD IN RCRD: CPT | Performed by: NURSE PRACTITIONER

## 2024-05-20 PROCEDURE — 83690 ASSAY OF LIPASE: CPT | Performed by: NURSE PRACTITIONER

## 2024-05-20 PROCEDURE — 1126F AMNT PAIN NOTED NONE PRSNT: CPT | Performed by: NURSE PRACTITIONER

## 2024-05-20 PROCEDURE — 1160F RVW MEDS BY RX/DR IN RCRD: CPT | Performed by: NURSE PRACTITIONER

## 2024-05-20 PROCEDURE — 80053 COMPREHEN METABOLIC PANEL: CPT | Performed by: NURSE PRACTITIONER

## 2024-05-20 PROCEDURE — 83036 HEMOGLOBIN GLYCOSYLATED A1C: CPT | Performed by: NURSE PRACTITIONER

## 2024-05-20 PROCEDURE — 85025 COMPLETE CBC W/AUTO DIFF WBC: CPT | Performed by: NURSE PRACTITIONER

## 2024-05-20 PROCEDURE — 99214 OFFICE O/P EST MOD 30 MIN: CPT | Performed by: NURSE PRACTITIONER

## 2024-05-20 PROCEDURE — 80061 LIPID PANEL: CPT | Performed by: NURSE PRACTITIONER

## 2024-05-20 RX ORDER — VIT C/B6/B5/MAGNESIUM/HERB 173 50-5-6-5MG
CAPSULE ORAL EVERY 24 HOURS
COMMUNITY

## 2024-05-20 RX ORDER — GLIMEPIRIDE 1 MG/1
1 TABLET ORAL 2 TIMES DAILY
Qty: 180 TABLET | Refills: 1 | Status: SHIPPED | OUTPATIENT
Start: 2024-05-20

## 2024-05-20 RX ORDER — ONDANSETRON 4 MG/1
TABLET, FILM COATED ORAL AS NEEDED
COMMUNITY

## 2024-05-20 NOTE — PROGRESS NOTES
Chief Complaint  Diabetes (Discuss glimepiride)    Subjective        Medical History: has a past medical history of Allergies, Anemia (1982), Arthritis, Cataracts, bilateral, Diabetes, Esophageal reflux disease, Forgetfulness, Gall stones (01/1985), Head injury (1964), Hemorrhoids (1967), HLD (hyperlipidemia), Seizures, Sinus trouble, and SOB (shortness of breath).     Surgical History: has a past surgical history that includes Appendectomy (04/1966); Breast biopsy (Right, 08/2013); Cataract extraction (05/2018); Colonoscopy; Esophagogastroduodenoscopy (2019); TOENAIL EXCISION (Left, 6/1965, 2008, 2016); Gallbladder surgery (01/1985); Hysterectomy (06/1984); Tonsillectomy (05/1965); Cardiac catheterization (09/27/2012); Cataract extraction (05/15/2018); Colonoscopy (10/10/2019); Mammo diagnostic digital tomosynthesis unilateral (01/29/2020); and Arteriogram with Ultrasound Aorto Iliac Femoral Renal Arteries (Bilateral, 01/29/2020).     Family History: family history includes Diabetes in an other family member; Heart disease in an other family member.     Social History: reports that she has never smoked. She has never used smokeless tobacco. She reports that she does not drink alcohol and does not use drugs.    Edna Alejandra presents to [facility]  History of Present Illness  Patient is a 78-year-old female who comes in for follow-up.  Approximately 5 months ago patient was diagnosed with pancreatic cancer.  Since then she has seen [facility], palliative chemotherapy was offered, patient states that she did not wanted at the time she was managing her symptoms with diet control, essential oils, and was feeling well on her own.  This was noted April 9, 2024 by her oncologist.  Patient comes in today for follow-up and see how she is doing.  Patient declined palliative care chemotherapy.  She has been eating a healthy diet, using her central oils, and treating herself more  holistic.  She has also purchased ivermectin to help aid in the cancer treatment. Patient has lost about 35 pounds, taking vitamin B17, patient states that as soon as she took her Ivermectin, patient is feeling good, no issues, she looks good. stop eating all sugars.   Diabetes  She presents for her follow-up diabetic visit. She has type 2 diabetes mellitus. Her disease course has been fluctuating. There are no hypoglycemic associated symptoms. Pertinent negatives for diabetes include no blurred vision, no fatigue, no polydipsia, no polyphagia and no polyuria. There are no hypoglycemic complications. Symptoms are improving. There are no diabetic complications. Risk factors for coronary artery disease include diabetes mellitus, dyslipidemia, obesity and post-menopausal. Current diabetic treatment includes oral agent (monotherapy). She is compliant with treatment most of the time. Her weight is decreasing steadily. She is following a diabetic and generally healthy diet. Meal planning includes carbohydrate counting, avoidance of concentrated sweets, calorie counting and ADA exchanges. She has not had a previous visit with a dietitian. She participates in exercise three times a week. Her home blood glucose trend is decreasing steadily. Her overall blood glucose range is 110-130 mg/dl. An ACE inhibitor/angiotensin II receptor blocker is not being taken. She does not see a podiatrist.Eye exam is not current.   Hyperlipidemia  This is a chronic problem. The current episode started more than 1 year ago. Recent lipid tests were reviewed and are variable. Exacerbating diseases include diabetes and obesity. Current antihyperlipidemic treatment includes statins. The current treatment provides no improvement of lipids. Compliance problems include medication side effects (will not take cholesterol medication).  Risk factors for coronary artery disease include diabetes mellitus, dyslipidemia, obesity and post-menopausal.  "  Objective   Vital Signs:   /68   Pulse 69   Temp 97.8 °F (36.6 °C)   Ht 160 cm (63\")   Wt 80.6 kg (177 lb 11.2 oz)   SpO2 98%   BMI 31.48 kg/m²       Wt Readings from Last 3 Encounters:   05/20/24 80.6 kg (177 lb 11.2 oz)   02/26/24 88.5 kg (195 lb 3.2 oz)   02/02/24 89.7 kg (197 lb 11.2 oz)        BP Readings from Last 3 Encounters:   05/20/24 126/68   02/26/24 134/78   02/02/24 148/84      Physical Exam  Vitals reviewed.   Constitutional:       General: She is not in acute distress.     Appearance: Normal appearance. She is well-developed. She is obese. She is not ill-appearing.   HENT:      Head: Normocephalic and atraumatic.   Eyes:      Conjunctiva/sclera: Conjunctivae normal.      Pupils: Pupils are equal, round, and reactive to light.   Cardiovascular:      Rate and Rhythm: Normal rate and regular rhythm.      Heart sounds: No murmur heard.  Pulmonary:      Effort: Pulmonary effort is normal.      Breath sounds: Normal breath sounds. No wheezing or rhonchi.   Musculoskeletal:      Right lower leg: No edema.      Left lower leg: No edema.   Skin:     General: Skin is warm and dry.   Neurological:      Mental Status: She is alert and oriented to person, place, and time.   Psychiatric:         Mood and Affect: Mood and affect normal.         Behavior: Behavior normal.         Thought Content: Thought content normal.         Judgment: Judgment normal.        Result Review :  {The following data was reviewed by LIAM López on 05/20/2024.  Common labs          12/25/2023    09:12 2/26/2024    11:30 2/26/2024    11:32   Common Labs   Glucose 284      BUN 12      Creatinine 0.70      Sodium 135      Potassium 4.1      Chloride 99      Calcium 9.3      Albumin 4.0      Total Bilirubin 0.6      Alkaline Phosphatase 85      AST (SGOT) 16      ALT (SGPT) 13      WBC 6.51      Hemoglobin 14.5      Hematocrit 44.0      Platelets 224      Total Cholesterol  210     Triglycerides  109     HDL " Cholesterol  57     LDL Cholesterol   134     Hemoglobin A1C  8.00     Microalbumin, Urine   <1.2      Data reviewed : Most recent oncology note             Current Outpatient Medications on File Prior to Visit   Medication Sig Dispense Refill    B Complex Vitamins (VITAMIN B COMPLEX PO) Take  by mouth.      CINNAMON PO Take  by mouth.      IVERMECTIN PO Take  by mouth.      MAGNESIUM OXIDE PO Take 1 capsule by mouth Daily.      ondansetron (Zofran) 4 MG tablet As Needed.      phytonadione (MEPHYTON, VITAMIN K) 5 MG tablet Take 1 tablet by mouth 1 (One) Time.      Turmeric 500 MG capsule Daily.      vitamin C (ASCORBIC ACID) 500 MG tablet Take 1 tablet by mouth 2 (Two) Times a Day.      vitamin D3 125 MCG (5000 UT) capsule capsule Take 1 capsule by mouth 2 (Two) Times a Day.      vitamin E 400 UNIT capsule Take 1 capsule by mouth Daily.      Zinc 50 MG capsule Take 1 each by mouth 2 (Two) Times a Day.      [DISCONTINUED] glimepiride (AMARYL) 1 MG tablet TAKE 1 TABLET BY MOUTH TWICE A DAY (Patient taking differently: Take 1 tablet by mouth. Pt sometimes only takes once a day depending on blood sugars) 180 tablet 1    [DISCONTINUED] coenzyme Q10 100 MG capsule Take 1 capsule by mouth Daily.       No current facility-administered medications on file prior to visit.        Assessment and Plan  Diagnoses and all orders for this visit:    1. Follow-up exam (Primary)    2. Type 2 diabetes mellitus without complication, without long-term current use of insulin  -     CBC Auto Differential  -     Comprehensive Metabolic Panel  -     Hemoglobin A1c  -     Lipid Panel    3. Malignant neoplasm of pancreas, unspecified location of malignancy  -     Lipase    4. Mixed hyperlipidemia  -     CBC Auto Differential  -     Comprehensive Metabolic Panel  -     Lipid Panel    Other orders  -     glimepiride (AMARYL) 1 MG tablet; Take 1 tablet by mouth 2 (Two) Times a Day.  Dispense: 180 tablet; Refill: 1    Patient is doing well, No  significant issues. Minimal pain, takes an Aleve 1 time a day, otherwise no additional pain medication. Patient states that she dose get tired more and taking a nap.  She is keeping strict control of her blood sugars, blood sugar log in office showed well-controlled blood sugars.  Patient is currently on the Amaryl 1 mg 2 times a day.  Will continue on current medication at this time.  Will continue to monitor patient's progress with [facility].  Patient verbalized understanding agreeable treatment plan.    Follow Up   Return in about 3 months (around 8/20/2024) for Recheck.  Patient was given instructions and counseling regarding her condition or for health maintenance advice. Please see specific information pulled into the AVS if appropriate.       Part of this note may be electronic transcription/translation of spoken language to printed text using the Dragon dictation system

## 2024-08-05 PROBLEM — C78.7 PANCREATIC CANCER METASTASIZED TO LIVER: Status: ACTIVE | Noted: 2024-08-05

## 2024-08-05 PROBLEM — C25.9 PANCREATIC CANCER METASTASIZED TO LIVER: Status: ACTIVE | Noted: 2024-08-05

## 2024-08-05 NOTE — PROGRESS NOTES
Chief Complaint/Care Team   Pancreatic cancer metastasized to liver    Mimi Mariano MD White, Afia Sullivan, LIAM    History of Present Illness     Diagnosis: Stage IV Pancreatic Cancer with liver metastasis (liver biopsy from 3/4/2024 at [facility])  Pt had Foundation one testing with Dr. Mariano, which was negative for actionable mutations.     Current Treatment: Offered palliative chemotherapy FOLFIRINOX Q2 weeks    Previous Treatment: Liver biopsy at [facility] [place] was positive for pancreatic cancer    Edna Alejandra is a 79 y.o. female who presents to [facility] for Stage IV Pancreatic Cancer with liver metastasis (liver biopsy from 3/4/2024 at [facility]).      Patient with abdominal pain that began around [place] Zoya in 2023, was evaluated in ER, imaging of the abdomen was concerning for pancreatic mass.  In January 2024, patient MRI abdomen which was concerning for a solid and cystic necrotic mass superior and inseparable from the pancreatic head and liver mass concerning for metastasis, as well as retroperitoneal and retrocrural lymph nodes consistent with cathleen metastasis.  She underwent EGD and upper EUS with biopsy above pancreatic mass and liver mass which was positive for adenocarcinoma.  Following initial consultation with Dr. Mariano she declined palliative systemic therapy for cancer and lieu of alternative treatments and elected to follow-up every 3 months with Dr. Mariano for management of cancer associated symptoms.    Pt underwent Foundation one testing with Dr. Mariano, which was negative for actionable mutations. Pt requested treatment closer to home, thus she was referred to [facility] onc[facility].    Most recent staging imaging with CT Chest, abdomen,pelvis from 7/9/2024  COMBINED IMPRESSION:     1. Mass in the body of the pancreas.     1a. Numerous large hepatic metastases.     2. Malignant lymphadenopathy in the mediastinum, both roland,  "laura hepatis, peripancreatic region, retrocrural region and superior portion of the retroperitoneum.     3. Bilateral pleural effusions.     4. No evidence of pulmonary or skeletal metastases.     5. No evidence of neoplastic spread seen elsewhere.     6. Large mixed hiatus hernia.     Patient here with his son to discuss systemic therapy options.  She is currently receiving oxycodone 5 mg every 4-6 hours as needed for pain.  She reports taking one half of a pill which manages her pain.  She expressed interest in undergoing evaluation by radiation oncology for palliative radiation.  She reports continuing decrease in appetite and GI upset along with heartburn.    History of Present Illness         Review of Systems   Constitutional:  Positive for appetite change and unexpected weight loss.   Gastrointestinal:  Positive for nausea and vomiting.   Neurological:  Positive for weakness.        Oncology/Hematology History   Pancreatic cancer metastasized to liver   8/5/2024 Initial Diagnosis    Pancreatic cancer metastasized to liver     8/6/2024 -  Chemotherapy    OP PANCREATIC FOLFIRINOX OXALIplatin / Leucovorin / Irinotecan / Fluorouracil         Objective     Vitals:    08/06/24 1333   BP: 137/78   Pulse: 91   Resp: 18   Temp: 99.6 °F (37.6 °C)   TempSrc: Temporal   SpO2: 96%   Weight: 73.1 kg (161 lb 1.6 oz)   Height: 160 cm (63\")   PainSc:   7   PainLoc: Rib Cage     ECOG score: 0         PHQ-9 Total Score:         Physical Exam  Vitals reviewed. Exam conducted with a chaperone present.   Constitutional:       General: She is not in acute distress.     Appearance: Normal appearance.   HENT:      Head: Normocephalic and atraumatic.   Eyes:      General: No scleral icterus.     Extraocular Movements: Extraocular movements intact.      Conjunctiva/sclera: Conjunctivae normal.   Pulmonary:      Effort: Pulmonary effort is normal.   Musculoskeletal:      Cervical back: Normal range of motion and neck supple.   Skin:    "  General: Skin is warm and dry.      Coloration: Skin is not jaundiced.      Findings: No bruising.   Neurological:      Mental Status: She is oriented to person, place, and time.         Physical Exam        Past Medical History     Past Medical History:   Diagnosis Date    Allergies     Anemia 1982    Arthritis     Cataracts, bilateral     Diabetes     Esophageal reflux disease     Forgetfulness     Gall stones 01/1985    Head injury 1964    Hemorrhoids 1967    HLD (hyperlipidemia)     Seizures     AS A CHILD    Sinus trouble     SOB (shortness of breath)      Current Outpatient Medications on File Prior to Visit   Medication Sig Dispense Refill    B Complex Vitamins (VITAMIN B COMPLEX PO) Take  by mouth.      CINNAMON PO Take  by mouth.      glimepiride (AMARYL) 1 MG tablet Take 1 tablet by mouth 2 (Two) Times a Day. 180 tablet 1    IVERMECTIN PO Take  by mouth.      MAGNESIUM OXIDE PO Take 1 capsule by mouth Daily.      Nutritional Supplements (JUICE PLUS FIBRE PO) Take  by mouth.      ondansetron (Zofran) 4 MG tablet As Needed.      oxyCODONE (ROXICODONE) 5 MG immediate release tablet 1 TABLET BY MOUTH EVERY 4 - 6 HOURS AS NEEDED FOR PAIN      phytonadione (MEPHYTON, VITAMIN K) 5 MG tablet Take 1 tablet by mouth 1 (One) Time.      Turmeric 500 MG capsule Daily.      vitamin C (ASCORBIC ACID) 500 MG tablet Take 1 tablet by mouth 2 (Two) Times a Day.      vitamin D3 125 MCG (5000 UT) capsule capsule Take 1 capsule by mouth 2 (Two) Times a Day.      vitamin E 400 UNIT capsule Take 1 capsule by mouth Daily.      Zinc 50 MG capsule Take 1 each by mouth 2 (Two) Times a Day.       No current facility-administered medications on file prior to visit.      Allergies   Allergen Reactions    Codeine Palpitations     Past Surgical History:   Procedure Laterality Date    APPENDECTOMY  04/1966    BREAST BIOPSY Right 08/2013    CARDIAC CATHETERIZATION  09/27/2012    CATARACT EXTRACTION  05/2018    CATARACT EXTRACTION   05/15/2018    right eye    COLONOSCOPY      10 YEARS AGO    COLONOSCOPY  10/10/2019    DIAGNOSTIC ARTERIOGRAM WITH ULTRASOUND AORTO ILIAC FEMORAL RENAL ARTERIES Bilateral 01/29/2020    ENDOSCOPY  2019    DONE IN [place]    GALLBLADDER SURGERY  01/1985    HYSTERECTOMY  06/1984    MAMMO DIAGNOSTIC DIGITAL TOMOSYNTHESIS UNILATERAL  01/29/2020    TOENAIL EXCISION Left 6/1965, 2008, 2016    INGROWN TOENAIL; BIG TOENAIL    TONSILLECTOMY  05/1965     Social History     Socioeconomic History    Marital status:    Tobacco Use    Smoking status: Never    Smokeless tobacco: Never   Vaping Use    Vaping status: Never Used   Substance and Sexual Activity    Alcohol use: Never    Drug use: Never    Sexual activity: Defer     Family History   Problem Relation Age of Onset    Heart disease Other     Diabetes Other        Results     Result Review   The following data was reviewed by: Salvatore Posada MD on 08/06/2024:  Lab Results   Component Value Date    HGB 14.4 05/20/2024    HCT 45.1 05/20/2024    MCV 92.6 05/20/2024     05/20/2024    WBC 6.44 05/20/2024    NEUTROABS 4.12 05/20/2024    LYMPHSABS 1.76 05/20/2024    MONOSABS 0.42 05/20/2024    EOSABS 0.08 05/20/2024    BASOSABS 0.03 05/20/2024     Lab Results   Component Value Date    GLUCOSE 143 (H) 05/20/2024    BUN 14 05/20/2024    CREATININE 0.61 05/20/2024     05/20/2024    K 4.4 05/20/2024     05/20/2024    CO2 27.0 05/20/2024    CALCIUM 9.6 05/20/2024    PROTEINTOT 6.7 05/20/2024    ALBUMIN 4.3 05/20/2024    BILITOT 0.6 05/20/2024    ALKPHOS 95 05/20/2024    AST 26 05/20/2024    ALT 21 05/20/2024     Lab Results   Component Value Date    FREET4 1.2 02/05/2021    TSH 1.030 08/24/2022       No radiology results for the last day       Assessment & Plan     Diagnoses and all orders for this visit:    1. Pancreatic cancer metastasized to liver (Primary)  -     Ambulatory Referral to Radiation Oncology-[facility]  -     CBC & Differential; Future  -      Comprehensive Metabolic Panel; Future  -     naloxone (NARCAN) 4 MG/0.1ML nasal spray; 1 spray into the nostril(s) as directed by provider As Needed for Opioid Reversal.  Dispense: 1 each; Refill: 1         Edna Alejandra is a 79 y.o. female who presents to [facility] for discussion of systemic treatment for Stage IV pancreatic Cancer with liver metastasis (liver biopsy from 3/4/2024 at [facility]). Pt had initially declined systemic treatment with Dr. Mariano at [facility] and preferred to undergo periodic imaging. Most recent imaging was from 7/2024 showed Numerous large hepatic metastases, malignant lymphadenopathy in the mediastinum, both roland, laura hepatis, peripancreatic region, retrocrural region and superior portion of the retroperitoneum, Bilateral pleural effusions, No evidence of pulmonary or skeletal metastases, No evidence of neoplastic spread seen elsewhere, Large mixed hiatus hernia.       Pt had Foundation one testing with Dr. Mariano, which was negative for actionable mutations.     -discussed option of palliative chemotherapy with FOLFIRINOX Q2 weeks, discussed common side effects of this regimen, pt provided handout regarding these medications, pt shared she would like to think about this and will notify me if she would like to pursue chemotherapy treatment (will adjust her clinic appointment accordingly).  -offered [employer] genetic testing, but pt shared she was interested in undergoing this testing tomorrow  -thus ordered CBC, CMP, [employer] testing prior to pt follow up with me  -pt inquired regarding radiation therapy options, thus referred pt to Rad/onc  -also discussed option of hospice vs continue observation for her cancer      Cancer related pain  -provided refill of oxycodone 5mg PO Q4H prn moderate pain and also provided narcan    Plan for pt follow up in 4 weeks to discuss final treatment plan.     Please note that portions of this note were completed with a  voice recognition program.    Electronically signed by Salvatore Posada MD, 08/06/24, 2:45 PM EDT.    Assessment & Plan      Follow Up     I spent 60 minutes caring for Edna on this date of service. This time includes time spent by me in the following activities:preparing for the visit, reviewing tests, obtaining and/or reviewing a separately obtained history, performing a medically appropriate examination and/or evaluation , counseling and educating the patient/family/caregiver, ordering medications, tests, or procedures, referring and communicating with other health care professionals , documenting information in the medical record, independently interpreting results and communicating that information with the patient/family/caregiver, and care coordination    Any chemotherapy or immunotherapy or other systemic therapy treatment plan involves a high risk of complications and/or mortality of patient management.    The patient was seen and examined. Work by the provider also included review and/or ordering of lab tests, review and/or ordering of radiology tests, review and/or ordering of medicine tests, discussion with other physicians or providers, independent review of data, obtaining old records, review/summation of old records, and/or other review.    I have reviewed the family history, social history, and past medical history for this patient. Previous information and data has been reviewed and updated as needed. I have reviewed and verified the chief complaint, history, and other documentation. The patient was interviewed and examined in the clinic and the chart reviewed. The previous observations, recommendations, and conclusions were reviewed including those of other providers.     The plan was discussed with the patient and/or family. The patient was given time to ask questions and these questions were answered. At the conclusion of their visit they had no additional questions or concerns and all questions  were answered to their satisfaction.    Patient was given instructions and counseling regarding her condition or for health maintenance advice. Please see specific information pulled into the AVS if appropriate.

## 2024-08-06 ENCOUNTER — CONSULT (OUTPATIENT)
Dept: ONCOLOGY | Facility: HOSPITAL | Age: 79
End: 2024-08-06
Payer: MEDICARE

## 2024-08-06 VITALS
RESPIRATION RATE: 18 BRPM | HEIGHT: 63 IN | SYSTOLIC BLOOD PRESSURE: 137 MMHG | TEMPERATURE: 99.6 F | WEIGHT: 161.1 LBS | OXYGEN SATURATION: 96 % | DIASTOLIC BLOOD PRESSURE: 78 MMHG | BODY MASS INDEX: 28.54 KG/M2 | HEART RATE: 91 BPM

## 2024-08-06 DIAGNOSIS — C78.7 PANCREATIC CANCER METASTASIZED TO LIVER: Primary | ICD-10-CM

## 2024-08-06 DIAGNOSIS — C25.9 PANCREATIC CANCER METASTASIZED TO LIVER: Primary | ICD-10-CM

## 2024-08-06 PROCEDURE — G0463 HOSPITAL OUTPT CLINIC VISIT: HCPCS | Performed by: INTERNAL MEDICINE

## 2024-08-06 PROCEDURE — 1125F AMNT PAIN NOTED PAIN PRSNT: CPT | Performed by: INTERNAL MEDICINE

## 2024-08-06 PROCEDURE — 99205 OFFICE O/P NEW HI 60 MIN: CPT | Performed by: INTERNAL MEDICINE

## 2024-08-06 RX ORDER — OXYCODONE HYDROCHLORIDE 5 MG/1
5 TABLET ORAL EVERY 6 HOURS PRN
Qty: 120 TABLET | Refills: 0 | Status: SHIPPED | OUTPATIENT
Start: 2024-08-06

## 2024-08-06 RX ORDER — OXYCODONE HYDROCHLORIDE 5 MG/1
TABLET ORAL
COMMUNITY
Start: 2024-07-11 | End: 2024-08-06 | Stop reason: SDUPTHER

## 2024-08-07 ENCOUNTER — LAB (OUTPATIENT)
Dept: ONCOLOGY | Facility: HOSPITAL | Age: 79
End: 2024-08-07
Payer: MEDICARE

## 2024-08-07 DIAGNOSIS — C78.7 PANCREATIC CANCER METASTASIZED TO LIVER: ICD-10-CM

## 2024-08-07 DIAGNOSIS — C25.9 PANCREATIC CANCER METASTASIZED TO LIVER: ICD-10-CM

## 2024-08-07 LAB
ALBUMIN SERPL-MCNC: 3.6 G/DL (ref 3.5–5.2)
ALBUMIN/GLOB SERPL: 1.4 G/DL
ALP SERPL-CCNC: 168 U/L (ref 39–117)
ALT SERPL W P-5'-P-CCNC: 39 U/L (ref 1–33)
ANION GAP SERPL CALCULATED.3IONS-SCNC: 8 MMOL/L (ref 5–15)
AST SERPL-CCNC: 68 U/L (ref 1–32)
BASOPHILS # BLD AUTO: 0.04 10*3/MM3 (ref 0–0.2)
BASOPHILS NFR BLD AUTO: 0.6 % (ref 0–1.5)
BILIRUB SERPL-MCNC: 1.1 MG/DL (ref 0–1.2)
BUN SERPL-MCNC: 13 MG/DL (ref 8–23)
BUN/CREAT SERPL: 20.3 (ref 7–25)
CALCIUM SPEC-SCNC: 10.7 MG/DL (ref 8.6–10.5)
CHLORIDE SERPL-SCNC: 99 MMOL/L (ref 98–107)
CO2 SERPL-SCNC: 29 MMOL/L (ref 22–29)
CREAT SERPL-MCNC: 0.64 MG/DL (ref 0.57–1)
DEPRECATED RDW RBC AUTO: 49.9 FL (ref 37–54)
EGFRCR SERPLBLD CKD-EPI 2021: 91.2 ML/MIN/1.73
EOSINOPHIL # BLD AUTO: 0.05 10*3/MM3 (ref 0–0.4)
EOSINOPHIL NFR BLD AUTO: 0.7 % (ref 0.3–6.2)
ERYTHROCYTE [DISTWIDTH] IN BLOOD BY AUTOMATED COUNT: 13.9 % (ref 12.3–15.4)
GLOBULIN UR ELPH-MCNC: 2.5 GM/DL
GLUCOSE SERPL-MCNC: 116 MG/DL (ref 65–99)
HCT VFR BLD AUTO: 43.4 % (ref 34–46.6)
HGB BLD-MCNC: 13.9 G/DL (ref 12–15.9)
IMM GRANULOCYTES # BLD AUTO: 0.01 10*3/MM3 (ref 0–0.05)
IMM GRANULOCYTES NFR BLD AUTO: 0.1 % (ref 0–0.5)
LYMPHOCYTES # BLD AUTO: 0.97 10*3/MM3 (ref 0.7–3.1)
LYMPHOCYTES NFR BLD AUTO: 13.4 % (ref 19.6–45.3)
MCH RBC QN AUTO: 31.1 PG (ref 26.6–33)
MCHC RBC AUTO-ENTMCNC: 32 G/DL (ref 31.5–35.7)
MCV RBC AUTO: 97.1 FL (ref 79–97)
MONOCYTES # BLD AUTO: 0.76 10*3/MM3 (ref 0.1–0.9)
MONOCYTES NFR BLD AUTO: 10.5 % (ref 5–12)
NEUTROPHILS NFR BLD AUTO: 5.39 10*3/MM3 (ref 1.7–7)
NEUTROPHILS NFR BLD AUTO: 74.7 % (ref 42.7–76)
PLATELET # BLD AUTO: 308 10*3/MM3 (ref 140–450)
PMV BLD AUTO: 11 FL (ref 6–12)
POTASSIUM SERPL-SCNC: 3.9 MMOL/L (ref 3.5–5.2)
PROT SERPL-MCNC: 6.1 G/DL (ref 6–8.5)
RBC # BLD AUTO: 4.47 10*6/MM3 (ref 3.77–5.28)
SODIUM SERPL-SCNC: 136 MMOL/L (ref 136–145)
WBC NRBC COR # BLD AUTO: 7.22 10*3/MM3 (ref 3.4–10.8)

## 2024-08-07 PROCEDURE — 36415 COLL VENOUS BLD VENIPUNCTURE: CPT

## 2024-08-07 PROCEDURE — 80053 COMPREHEN METABOLIC PANEL: CPT

## 2024-08-07 PROCEDURE — 85025 COMPLETE CBC W/AUTO DIFF WBC: CPT

## 2024-08-12 ENCOUNTER — DOCUMENTATION (OUTPATIENT)
Dept: PHARMACY | Facility: HOSPITAL | Age: 79
End: 2024-08-12
Payer: MEDICARE

## 2024-08-12 ENCOUNTER — TELEPHONE (OUTPATIENT)
Dept: RADIATION ONCOLOGY | Facility: HOSPITAL | Age: 79
End: 2024-08-12
Payer: MEDICARE

## 2024-08-12 ENCOUNTER — DOCUMENTATION (OUTPATIENT)
Dept: ONCOLOGY | Facility: HOSPITAL | Age: 79
End: 2024-08-12
Payer: MEDICARE

## 2024-08-12 NOTE — TELEPHONE ENCOUNTER
Patient called at 8:10 a.m. Patient stated that this appt is too far out and that she is dying, so there is not point for this appt. Patient also picked 08/16/24 for her appt because of transportation.

## 2024-08-12 NOTE — PROGRESS NOTES
"PHARMACY C1D1 PRE VISIT ASSESSMENT    Patient will present for C1D1 of oxaliplatin 85 mg/m2 + leucovorin 400 mg/m2 + irinotecan 180 mg/m2 + fluorouracil 400 mg/m2 IV bolus + fluorouracil 2400 mg/m2 CIV over 46 hours Q14D       79 y.o. female  Estimated body surface area is 1.76 meters squared as calculated from the following:    Height as of 8/6/24: 160 cm (63\").    Weight as of 8/6/24: 73.1 kg (161 lb 1.6 oz).    Past Medical History:   Diagnosis Date    Allergies     Anemia 1982    Arthritis     Cataracts, bilateral     Diabetes     Esophageal reflux disease     Forgetfulness     Gall stones 01/1985    Head injury 1964    Hemorrhoids 1967    HLD (hyperlipidemia)     Seizures     AS A CHILD    Sinus trouble     SOB (shortness of breath)        Oncology/Hematology History   Pancreatic cancer metastasized to liver   8/5/2024 Initial Diagnosis    Pancreatic cancer metastasized to liver     8/6/2024 -  Chemotherapy    OP PANCREATIC FOLFIRINOX OXALIplatin / Leucovorin / Irinotecan / Fluorouracil         ONC Staging:  Metastatic pancreatic adenocarcinoma     Relevant Pathology:     [facility] Liver Biopsy   CLINICAL HISTORY:   lesion of liver, cyst of pancreas     SPECIMEN ADEQUACY:   Satisfactory for evaluation     DIAGNOSIS:   A) Liver mass fine needle aspiration (2 smears and a cell block):   - Positive for malignancy, metastatic adenocarcinoma, see comment     B) Pancreas mass fine needle aspiration (2 smears and a cell block):   - Positive for malignancy, adenocarcinoma, see comment     Electronically signed by THALIA KIRKLAND   Verified: 03/07/24     Potentially Actionable Mutation Present: NO    Relevant Imaging:    CT ABDOMEN PELVIS 7/9/24  COMBINED IMPRESSION:     1. Mass in the body of the pancreas.     1a. Numerous large hepatic metastases.     2. Malignant lymphadenopathy in the mediastinum, both roland, laura hepatis, peripancreatic region, retrocrural region and superior portion of the retroperitoneum.     3. " Bilateral pleural effusions.     4. No evidence of pulmonary or skeletal metastases.     5. No evidence of neoplastic spread seen elsewhere.     6. Large mixed hiatus hernia.     Dictated by: Justo Oliver MD Signed by Justo Oliver MD on 7/9/2024 12:58   ##### Final #####     Current treatment regimen has insurance authorization approval? YES    Medication treatment plan entered is appropriate per NCCN Guidelines    Pharmacy Follow-up Considerations:   Patient with metastatic pancreatic adenocarcinoma to liver. Plan to initiate patient on FOLFIRINOX. Pharmacy will continue to monitor labs while on treatment and will  patient prior to first infusion on cycle 1 day 1.     Jd Mitchell, PharmD, BCPS  8/12/2024  11:09 EDT

## 2024-08-14 ENCOUNTER — DOCUMENTATION (OUTPATIENT)
Dept: ONCOLOGY | Facility: HOSPITAL | Age: 79
End: 2024-08-14
Payer: MEDICARE

## 2024-08-20 ENCOUNTER — TELEPHONE (OUTPATIENT)
Dept: FAMILY MEDICINE CLINIC | Facility: CLINIC | Age: 79
End: 2024-08-20

## 2024-08-21 ENCOUNTER — TELEPHONE (OUTPATIENT)
Dept: FAMILY MEDICINE CLINIC | Facility: CLINIC | Age: 79
End: 2024-08-21

## 2024-08-21 NOTE — TELEPHONE ENCOUNTER
Amrik with [facility] called and states pt's cousin is in town and arguing and causing issues with her . The cousin is wanting  [facility] stopped and nutrition given. Pt current dx with malignant ca. I will check on POA and call them to find out what is going on.

## 2024-08-21 NOTE — TELEPHONE ENCOUNTER
OSW attempted to reach the hospice nurse back and left a voicemail with my direct number where she may reach me back at for further support, [phone].     OSW has also reached out to Alia MIKE, Director at [facility]. Alia advised that the family has inquired about TPN and has been educated that this is considered aggressive care, and therefore would need to revocate hospice services to seek such care.     OSW consulted with our oncology RD who confirmed TPN would not be appropriate and could be more detrimental than beneficial.      OSW received a return phone call from Amrik AGUILERA RN at [facility]. Amrik reports she provided the patient's cousin with emotional support and education to help her understand the patient's prognosis and the nutritional aspect of things. At the end of their visit, the cousin was more understanding and accepting, but family may want to hear from another healthcare provider to confirm that artificial nutrition is not appropriate option. Will plan to touch base with the patient's son tomorrow. OSW support remains available.

## 2024-08-22 NOTE — TELEPHONE ENCOUNTER
OSW contacted Ms. Alejandra's son via telephone this morning. He reports they've been advised by the hospice nurse that his mother is near end of life and that it's just a matter of days. He v/u that artificial nutrition wouldn't be beneficial. OSW provided emotional support and reassured that his mother's body is responding naturally and she is not suffering from starvation. He v/u and reports Shelia is bringing in a hospital bed for his mom sometime today. Ms. Alejandra's son reports she live with him and his spouse, however, they both work during the day. Therefore, her cousin is in town from [place] to be with her during the day while they're away at work. Her son did not identify any additional needs at this time and all the family's questions have been addressed to their satisfaction. Encouraged OSW support remains available.